# Patient Record
Sex: FEMALE | Race: WHITE | NOT HISPANIC OR LATINO | Employment: OTHER
[De-identification: names, ages, dates, MRNs, and addresses within clinical notes are randomized per-mention and may not be internally consistent; named-entity substitution may affect disease eponyms.]

---

## 2017-03-09 ENCOUNTER — CHARTING TRANS (OUTPATIENT)
Dept: OTHER | Age: 82
End: 2017-03-09

## 2017-04-26 ENCOUNTER — CHARTING TRANS (OUTPATIENT)
Dept: OTHER | Age: 82
End: 2017-04-26

## 2017-04-26 ENCOUNTER — MYAURORA ACCOUNT LINK (OUTPATIENT)
Dept: OTHER | Age: 82
End: 2017-04-26

## 2017-06-08 ENCOUNTER — CHARTING TRANS (OUTPATIENT)
Dept: OTHER | Age: 82
End: 2017-06-08

## 2017-06-09 ENCOUNTER — CHARTING TRANS (OUTPATIENT)
Dept: OTHER | Age: 82
End: 2017-06-09

## 2017-06-12 ENCOUNTER — MYAURORA ACCOUNT LINK (OUTPATIENT)
Dept: OTHER | Age: 82
End: 2017-06-12

## 2017-06-12 ENCOUNTER — CHARTING TRANS (OUTPATIENT)
Dept: OTHER | Age: 82
End: 2017-06-12

## 2017-07-03 ENCOUNTER — CHARTING TRANS (OUTPATIENT)
Dept: FAMILY MEDICINE | Age: 82
End: 2017-07-03

## 2017-07-03 ENCOUNTER — IMAGING SERVICES (OUTPATIENT)
Dept: OTHER | Age: 82
End: 2017-07-03

## 2017-07-03 ENCOUNTER — CHARTING TRANS (OUTPATIENT)
Dept: OTHER | Age: 82
End: 2017-07-03

## 2017-07-05 ENCOUNTER — CHARTING TRANS (OUTPATIENT)
Dept: OTHER | Age: 82
End: 2017-07-05

## 2017-07-07 ENCOUNTER — LAB SERVICES (OUTPATIENT)
Dept: OTHER | Age: 82
End: 2017-07-07

## 2017-07-07 LAB
ALBUMIN SERPL BCG-MCNC: 3.8 G/DL (ref 3.6–5.1)
ALP SERPL-CCNC: 80 U/L (ref 45–105)
ALT SERPL W/O P-5'-P-CCNC: 30 U/L (ref 15–43)
AST SERPL-CCNC: 29 U/L (ref 14–43)
BASOPHIL %: 0.5 % (ref 0–1.2)
BASOPHIL ABSOLUTE #: 0 10*3/UL (ref 0–0.1)
BILIRUB SERPL-MCNC: 0.5 MG/DL (ref 0–1.3)
BUN SERPL-MCNC: 14 MG/DL (ref 7–20)
CALCIUM SERPL-MCNC: 9.3 MG/DL (ref 8.6–10.6)
CHLORIDE SERPL-SCNC: 107 MMOL/L (ref 96–107)
CHOLEST SERPL-MCNC: 192 MG/DL (ref 140–200)
CREATININE, SERUM: 0.7 MG/DL (ref 0.5–1.4)
DIFFERENTIAL TYPE: ABNORMAL
EOSINOPHIL %: 2.7 % (ref 0–10)
EOSINOPHIL ABSOLUTE #: 0.2 10*3/UL (ref 0–0.5)
GFR SERPL CREATININE-BSD FRML MDRD: >60 ML/MIN/{1.73M2}
GFR SERPL CREATININE-BSD FRML MDRD: >60 ML/MIN/{1.73M2}
GLUCOSE P FAST SERPL-MCNC: 93 MG/DL (ref 60–100)
HCO3 SERPL-SCNC: 28 MMOL/L (ref 22–32)
HDLC SERPL-MCNC: 63 MG/DL
HEMATOCRIT: 38.7 % (ref 34–45)
HEMOGLOBIN: 12.5 G/DL (ref 11.2–15.7)
LDLC SERPL CALC-MCNC: 112 MG/DL (ref 30–100)
LYMPH PERCENT: 19.8 % (ref 20.5–51.1)
LYMPHOCYTE ABSOLUTE #: 1.6 10*3/UL (ref 1.2–3.4)
MEAN CORPUSCULAR HGB CONCENTRATION: 32.3 % (ref 32–36)
MEAN CORPUSCULAR HGB: 28 PG (ref 27–34)
MEAN CORPUSCULAR VOLUME: 86.6 FL (ref 79–95)
MEAN PLATELET VOLUME: 9.9 FL (ref 8.6–12.4)
MONOCYTE ABSOLUTE #: 0.7 10*3/UL (ref 0.2–0.9)
MONOCYTE PERCENT: 8.5 % (ref 4.3–12.9)
NEUTROPHIL ABSOLUTE #: 5.5 10*3/UL (ref 1.4–6.5)
NEUTROPHIL PERCENT: 68.5 % (ref 34–73.5)
PLATELET COUNT: 246 10*3/UL (ref 150–400)
POTASSIUM SERPL-SCNC: 4.4 MMOL/L (ref 3.5–5.3)
PROT SERPL-MCNC: 6.8 G/DL (ref 6.4–8.5)
RED BLOOD CELL COUNT: 4.47 10*6/UL (ref 3.7–5.2)
RED CELL DISTRIBUTION WIDTH: 13.8 % (ref 11.3–14.8)
SODIUM SERPL-SCNC: 142 MMOL/L (ref 136–146)
TRIGL SERPL-MCNC: 86 MG/DL (ref 0–200)
TSH SERPL DL<=0.05 MIU/L-ACNC: 2.03 M[IU]/L (ref 0.3–4.82)
WHITE BLOOD CELL COUNT: 8.1 10*3/UL (ref 4–10)

## 2017-07-18 ENCOUNTER — CHARTING TRANS (OUTPATIENT)
Dept: OTHER | Age: 82
End: 2017-07-18

## 2017-07-19 ENCOUNTER — CHARTING TRANS (OUTPATIENT)
Dept: OTHER | Age: 82
End: 2017-07-19

## 2017-07-20 ENCOUNTER — CHARTING TRANS (OUTPATIENT)
Dept: OTHER | Age: 82
End: 2017-07-20

## 2017-07-21 ENCOUNTER — MYAURORA ACCOUNT LINK (OUTPATIENT)
Dept: OTHER | Age: 82
End: 2017-07-21

## 2017-07-21 ENCOUNTER — CHARTING TRANS (OUTPATIENT)
Dept: OTHER | Age: 82
End: 2017-07-21

## 2017-07-21 ENCOUNTER — CHARTING TRANS (OUTPATIENT)
Dept: OTOLARYNGOLOGY | Age: 82
End: 2017-07-21

## 2017-07-25 ENCOUNTER — CHARTING TRANS (OUTPATIENT)
Dept: OTHER | Age: 82
End: 2017-07-25

## 2017-07-27 ENCOUNTER — MYAURORA ACCOUNT LINK (OUTPATIENT)
Dept: OTHER | Age: 82
End: 2017-07-27

## 2017-07-27 ENCOUNTER — CHARTING TRANS (OUTPATIENT)
Dept: OTHER | Age: 82
End: 2017-07-27

## 2017-07-31 ENCOUNTER — CHARTING TRANS (OUTPATIENT)
Dept: OTHER | Age: 82
End: 2017-07-31

## 2017-08-03 ENCOUNTER — CHARTING TRANS (OUTPATIENT)
Dept: OTHER | Age: 82
End: 2017-08-03

## 2017-08-04 ENCOUNTER — CHARTING TRANS (OUTPATIENT)
Dept: OTHER | Age: 82
End: 2017-08-04

## 2017-08-04 ENCOUNTER — IMAGING SERVICES (OUTPATIENT)
Dept: OTHER | Age: 82
End: 2017-08-04

## 2017-08-07 ENCOUNTER — CHARTING TRANS (OUTPATIENT)
Dept: OTHER | Age: 82
End: 2017-08-07

## 2017-08-10 ENCOUNTER — CHARTING TRANS (OUTPATIENT)
Dept: OTHER | Age: 82
End: 2017-08-10

## 2017-08-14 ENCOUNTER — CHARTING TRANS (OUTPATIENT)
Dept: OTHER | Age: 82
End: 2017-08-14

## 2017-08-17 ENCOUNTER — MYAURORA ACCOUNT LINK (OUTPATIENT)
Dept: OTHER | Age: 82
End: 2017-08-17

## 2017-08-17 ENCOUNTER — CHARTING TRANS (OUTPATIENT)
Dept: OTHER | Age: 82
End: 2017-08-17

## 2017-08-25 ENCOUNTER — CHARTING TRANS (OUTPATIENT)
Dept: OTHER | Age: 82
End: 2017-08-25

## 2017-11-16 ENCOUNTER — CHARTING TRANS (OUTPATIENT)
Dept: OTHER | Age: 82
End: 2017-11-16

## 2017-11-20 ENCOUNTER — CHARTING TRANS (OUTPATIENT)
Dept: FAMILY MEDICINE | Age: 82
End: 2017-11-20

## 2017-11-20 ENCOUNTER — IMAGING SERVICES (OUTPATIENT)
Dept: OTHER | Age: 82
End: 2017-11-20

## 2017-11-21 ENCOUNTER — CHARTING TRANS (OUTPATIENT)
Dept: OTHER | Age: 82
End: 2017-11-21

## 2017-11-24 ENCOUNTER — CHARTING TRANS (OUTPATIENT)
Dept: FAMILY MEDICINE | Age: 82
End: 2017-11-24

## 2017-12-01 ENCOUNTER — MYAURORA ACCOUNT LINK (OUTPATIENT)
Dept: OTHER | Age: 82
End: 2017-12-01

## 2017-12-01 ENCOUNTER — CHARTING TRANS (OUTPATIENT)
Dept: OTHER | Age: 82
End: 2017-12-01

## 2017-12-29 ENCOUNTER — IMAGING SERVICES (OUTPATIENT)
Dept: OTHER | Age: 82
End: 2017-12-29

## 2018-01-25 ENCOUNTER — CHARTING TRANS (OUTPATIENT)
Dept: OTHER | Age: 83
End: 2018-01-25

## 2018-01-30 ENCOUNTER — IMAGING SERVICES (OUTPATIENT)
Dept: OTHER | Age: 83
End: 2018-01-30

## 2018-02-03 ENCOUNTER — CHARTING TRANS (OUTPATIENT)
Dept: OTHER | Age: 83
End: 2018-02-03

## 2018-02-05 ENCOUNTER — ANCILLARY ORDERS (OUTPATIENT)
Dept: OTHER | Age: 83
End: 2018-02-05

## 2018-02-05 DIAGNOSIS — R93.89 ABNORMAL FINDINGS ON DIAGNOSTIC IMAGING OF OTHER SPECIFIED BODY STRUCTURES: ICD-10-CM

## 2018-02-07 ENCOUNTER — MYAURORA ACCOUNT LINK (OUTPATIENT)
Dept: OTHER | Age: 83
End: 2018-02-07

## 2018-02-07 ENCOUNTER — CHARTING TRANS (OUTPATIENT)
Dept: OTHER | Age: 83
End: 2018-02-07

## 2018-03-08 ENCOUNTER — CHARTING TRANS (OUTPATIENT)
Dept: OTHER | Age: 83
End: 2018-03-08

## 2018-03-29 ENCOUNTER — MYAURORA ACCOUNT LINK (OUTPATIENT)
Dept: OTHER | Age: 83
End: 2018-03-29

## 2018-03-29 ENCOUNTER — LAB SERVICES (OUTPATIENT)
Dept: OTHER | Age: 83
End: 2018-03-29

## 2018-03-29 ENCOUNTER — CHARTING TRANS (OUTPATIENT)
Dept: OTHER | Age: 83
End: 2018-03-29

## 2018-03-29 LAB
ALBUMIN SERPL BCG-MCNC: 4 G/DL (ref 3.6–5.1)
ALP SERPL-CCNC: 88 U/L (ref 45–105)
ALT SERPL W/O P-5'-P-CCNC: 22 U/L (ref 15–43)
AST SERPL-CCNC: 28 U/L (ref 14–43)
BASOPHIL %: 0.5 % (ref 0–1.2)
BASOPHIL ABSOLUTE #: 0 10*3/UL (ref 0–0.1)
BILIRUB SERPL-MCNC: 0.5 MG/DL (ref 0–1.3)
BNP BLD-MCNC: 100 PG/ML (ref 0–100)
BUN SERPL-MCNC: 21 MG/DL (ref 7–20)
CALCIUM SERPL-MCNC: 9.5 MG/DL (ref 8.6–10.6)
CHLORIDE SERPL-SCNC: 102 MMOL/L (ref 96–107)
CREATININE, SERUM: 0.7 MG/DL (ref 0.5–1.4)
DIFFERENTIAL TYPE: NORMAL
EOSINOPHIL %: 3.4 % (ref 0–10)
EOSINOPHIL ABSOLUTE #: 0.2 10*3/UL (ref 0–0.5)
GFR SERPL CREATININE-BSD FRML MDRD: >60 ML/MIN/{1.73M2}
GFR SERPL CREATININE-BSD FRML MDRD: >60 ML/MIN/{1.73M2}
GLUCOSE SERPL-MCNC: 92 MG/DL (ref 70–200)
HCO3 SERPL-SCNC: 26 MMOL/L (ref 22–32)
HEMATOCRIT: 35.8 % (ref 34–45)
HEMOGLOBIN: 11.8 G/DL (ref 11.2–15.7)
LYMPH PERCENT: 24.2 % (ref 20.5–51.1)
LYMPHOCYTE ABSOLUTE #: 1.6 10*3/UL (ref 1.2–3.4)
MEAN CORPUSCULAR HGB CONCENTRATION: 33 % (ref 32–36)
MEAN CORPUSCULAR HGB: 28.4 PG (ref 27–34)
MEAN CORPUSCULAR VOLUME: 86.1 FL (ref 79–95)
MEAN PLATELET VOLUME: 9.7 FL (ref 8.6–12.4)
MONOCYTE ABSOLUTE #: 0.7 10*3/UL (ref 0.2–0.9)
MONOCYTE PERCENT: 10.5 % (ref 4.3–12.9)
NEUTROPHIL ABSOLUTE #: 4 10*3/UL (ref 1.4–6.5)
NEUTROPHIL PERCENT: 61.4 % (ref 34–73.5)
PLATELET COUNT: 244 10*3/UL (ref 150–400)
POTASSIUM SERPL-SCNC: 4.6 MMOL/L (ref 3.5–5.3)
PROT SERPL-MCNC: 7.1 G/DL (ref 6.4–8.5)
RED BLOOD CELL COUNT: 4.16 10*6/UL (ref 3.7–5.2)
RED CELL DISTRIBUTION WIDTH: 13.8 % (ref 11.3–14.8)
SODIUM SERPL-SCNC: 138 MMOL/L (ref 136–146)
WHITE BLOOD CELL COUNT: 6.5 10*3/UL (ref 4–10)

## 2018-04-03 ENCOUNTER — CHARTING TRANS (OUTPATIENT)
Dept: OTHER | Age: 83
End: 2018-04-03

## 2018-04-06 ENCOUNTER — CHARTING TRANS (OUTPATIENT)
Dept: OTHER | Age: 83
End: 2018-04-06

## 2018-04-10 ENCOUNTER — CHARTING TRANS (OUTPATIENT)
Dept: OTHER | Age: 83
End: 2018-04-10

## 2018-04-14 ENCOUNTER — CHARTING TRANS (OUTPATIENT)
Dept: OTHER | Age: 83
End: 2018-04-14

## 2018-04-18 ENCOUNTER — CHARTING TRANS (OUTPATIENT)
Dept: OTHER | Age: 83
End: 2018-04-18

## 2018-04-23 ENCOUNTER — CHARTING TRANS (OUTPATIENT)
Dept: OTHER | Age: 83
End: 2018-04-23

## 2018-04-23 ENCOUNTER — IMAGING SERVICES (OUTPATIENT)
Dept: OTHER | Age: 83
End: 2018-04-23

## 2018-04-23 ENCOUNTER — MYAURORA ACCOUNT LINK (OUTPATIENT)
Dept: OTHER | Age: 83
End: 2018-04-23

## 2018-04-24 ENCOUNTER — CHARTING TRANS (OUTPATIENT)
Dept: OTHER | Age: 83
End: 2018-04-24

## 2018-04-24 ENCOUNTER — ANCILLARY ORDERS (OUTPATIENT)
Dept: OTHER | Age: 83
End: 2018-04-24

## 2018-04-24 DIAGNOSIS — M81.0 AGE-RELATED OSTEOPOROSIS WITHOUT CURRENT PATHOLOGICAL FRACTURE: ICD-10-CM

## 2018-04-26 ENCOUNTER — CHARTING TRANS (OUTPATIENT)
Dept: OTHER | Age: 83
End: 2018-04-26

## 2018-04-30 ENCOUNTER — CHARTING TRANS (OUTPATIENT)
Dept: OTHER | Age: 83
End: 2018-04-30

## 2018-05-17 ENCOUNTER — CHARTING TRANS (OUTPATIENT)
Dept: OTHER | Age: 83
End: 2018-05-17

## 2018-05-17 ENCOUNTER — IMAGING SERVICES (OUTPATIENT)
Dept: OTHER | Age: 83
End: 2018-05-17

## 2018-05-21 ENCOUNTER — CHARTING TRANS (OUTPATIENT)
Dept: OTHER | Age: 83
End: 2018-05-21

## 2018-05-25 ENCOUNTER — CHARTING TRANS (OUTPATIENT)
Dept: OTHER | Age: 83
End: 2018-05-25

## 2018-06-01 ENCOUNTER — MYAURORA ACCOUNT LINK (OUTPATIENT)
Dept: OTHER | Age: 83
End: 2018-06-01

## 2018-06-05 ENCOUNTER — CHARTING TRANS (OUTPATIENT)
Dept: OTHER | Age: 83
End: 2018-06-05

## 2018-07-20 ENCOUNTER — CHARTING TRANS (OUTPATIENT)
Dept: OTHER | Age: 83
End: 2018-07-20

## 2018-07-27 ENCOUNTER — MYAURORA ACCOUNT LINK (OUTPATIENT)
Dept: OTHER | Age: 83
End: 2018-07-27

## 2018-07-27 ENCOUNTER — LAB SERVICES (OUTPATIENT)
Dept: OTHER | Age: 83
End: 2018-07-27

## 2018-07-27 LAB
ALBUMIN SERPL-MCNC: 4.3 G/DL (ref 3.6–5.1)
ALP SERPL-CCNC: 81 U/L (ref 45–130)
ALT SERPL-CCNC: 20 U/L (ref 15–43)
AST SERPL-CCNC: 30 U/L (ref 14–43)
BASOPHIL %: 0.5 % (ref 0–1.2)
BASOPHIL ABSOLUTE #: 0 10*3/UL (ref 0–0.1)
BILIRUB SERPL-MCNC: 0.6 MG/DL (ref 0–1.3)
BUN SERPL-MCNC: 14 MG/DL (ref 7–20)
CALCIUM SERPL-MCNC: 9.9 MG/DL (ref 8.6–10.6)
CHLORIDE SERPL-SCNC: 101 MMOL/L (ref 96–107)
CHOLEST SERPL-MCNC: 193 MG/DL (ref 140–200)
CO2 SERPL-SCNC: 31 MMOL/L (ref 22–32)
CREAT SERPL-MCNC: 0.6 MG/DL (ref 0.5–1.4)
DIFFERENTIAL TYPE: NORMAL
EOSINOPHIL %: 2.7 % (ref 0–10)
EOSINOPHIL ABSOLUTE #: 0.2 10*3/UL (ref 0–0.5)
GFR SERPL CREATININE-BSD FRML MDRD: >60 ML/MIN/{1.73M2}
GFR SERPL CREATININE-BSD FRML MDRD: >60 ML/MIN/{1.73M2}
GLUCOSE P FAST SERPL-MCNC: 95 MG/DL (ref 60–100)
HDLC SERPL-MCNC: 64 MG/DL
HEMATOCRIT: 39.9 % (ref 34–45)
HEMOGLOBIN: 12.8 G/DL (ref 11.2–15.7)
LDLC SERPL CALC-MCNC: 110 MG/DL (ref 30–100)
LYMPH PERCENT: 21 % (ref 20.5–51.1)
LYMPHOCYTE ABSOLUTE #: 1.7 10*3/UL (ref 1.2–3.4)
MEAN CORPUSCULAR HGB CONCENTRATION: 32.1 % (ref 32–36)
MEAN CORPUSCULAR HGB: 27.3 PG (ref 27–34)
MEAN CORPUSCULAR VOLUME: 85.1 FL (ref 79–95)
MEAN PLATELET VOLUME: 10.1 FL (ref 8.6–12.4)
MONOCYTE ABSOLUTE #: 0.6 10*3/UL (ref 0.2–0.9)
MONOCYTE PERCENT: 8 % (ref 4.3–12.9)
NEUTROPHIL ABSOLUTE #: 5.5 10*3/UL (ref 1.4–6.5)
NEUTROPHIL PERCENT: 67.8 % (ref 34–73.5)
PLATELET COUNT: 227 10*3/UL (ref 150–400)
POTASSIUM SERPL-SCNC: 5 MMOL/L (ref 3.5–5.3)
PROT SERPL-MCNC: 7.4 G/DL (ref 6.4–8.5)
RED BLOOD CELL COUNT: 4.69 10*6/UL (ref 3.7–5.2)
RED CELL DISTRIBUTION WIDTH: 14.7 % (ref 11.3–14.8)
SODIUM SERPL-SCNC: 140 MMOL/L (ref 136–146)
TRIGL SERPL-MCNC: 97 MG/DL (ref 0–200)
TSH SERPL DL<=0.05 MIU/L-ACNC: 2.27 M[IU]/L (ref 0.3–4.82)
WHITE BLOOD CELL COUNT: 8 10*3/UL (ref 4–10)

## 2018-10-19 ENCOUNTER — IMAGING SERVICES (OUTPATIENT)
Dept: OTHER | Age: 83
End: 2018-10-19

## 2018-10-19 ENCOUNTER — CHARTING TRANS (OUTPATIENT)
Dept: OTHER | Age: 83
End: 2018-10-19

## 2018-11-15 ENCOUNTER — CHARTING TRANS (OUTPATIENT)
Dept: OTHER | Age: 83
End: 2018-11-15

## 2018-11-23 ENCOUNTER — IMAGING SERVICES (OUTPATIENT)
Dept: OTHER | Age: 83
End: 2018-11-23

## 2018-11-27 VITALS
OXYGEN SATURATION: 98 % | HEART RATE: 79 BPM | TEMPERATURE: 99.3 F | DIASTOLIC BLOOD PRESSURE: 62 MMHG | WEIGHT: 115 LBS | SYSTOLIC BLOOD PRESSURE: 130 MMHG

## 2018-11-27 VITALS
SYSTOLIC BLOOD PRESSURE: 128 MMHG | HEART RATE: 97 BPM | OXYGEN SATURATION: 97 % | WEIGHT: 118 LBS | TEMPERATURE: 97 F | DIASTOLIC BLOOD PRESSURE: 74 MMHG

## 2018-11-28 VITALS — BODY MASS INDEX: 19.81 KG/M2 | WEIGHT: 116 LBS | HEIGHT: 64 IN

## 2018-11-28 VITALS
HEART RATE: 61 BPM | DIASTOLIC BLOOD PRESSURE: 98 MMHG | OXYGEN SATURATION: 98 % | HEIGHT: 64 IN | SYSTOLIC BLOOD PRESSURE: 142 MMHG | WEIGHT: 117 LBS | BODY MASS INDEX: 19.97 KG/M2

## 2018-11-28 VITALS — SYSTOLIC BLOOD PRESSURE: 140 MMHG | DIASTOLIC BLOOD PRESSURE: 80 MMHG | WEIGHT: 116 LBS | HEART RATE: 88 BPM

## 2018-12-04 ENCOUNTER — TELEPHONE (OUTPATIENT)
Dept: FAMILY MEDICINE | Age: 83
End: 2018-12-04

## 2018-12-04 ENCOUNTER — TELEPHONE (OUTPATIENT)
Dept: PULMONOLOGY | Age: 83
End: 2018-12-04

## 2018-12-05 RX ORDER — AZITHROMYCIN 250 MG/1
TABLET, FILM COATED ORAL
Qty: 6 TABLET | Refills: 0 | Status: SHIPPED | OUTPATIENT
Start: 2018-12-05 | End: 2018-12-10

## 2018-12-06 ENCOUNTER — APPOINTMENT (OUTPATIENT)
Dept: OPTOMETRY | Age: 83
End: 2018-12-06

## 2018-12-10 ENCOUNTER — TELEPHONE (OUTPATIENT)
Dept: SCHEDULING | Age: 83
End: 2018-12-10

## 2018-12-10 ENCOUNTER — OFFICE VISIT (OUTPATIENT)
Dept: PULMONOLOGY | Age: 83
End: 2018-12-10

## 2018-12-10 VITALS
TEMPERATURE: 98.1 F | HEART RATE: 87 BPM | DIASTOLIC BLOOD PRESSURE: 68 MMHG | BODY MASS INDEX: 19.46 KG/M2 | HEIGHT: 64 IN | SYSTOLIC BLOOD PRESSURE: 136 MMHG | WEIGHT: 114 LBS | OXYGEN SATURATION: 96 %

## 2018-12-10 VITALS — BODY MASS INDEX: 19.46 KG/M2 | HEIGHT: 64 IN | WEIGHT: 114 LBS

## 2018-12-10 DIAGNOSIS — I27.0 PPHT (PRIMARY PULMONARY HYPERTENSION) (CMD): ICD-10-CM

## 2018-12-10 DIAGNOSIS — J84.10 LUNG FIBROSIS (CMD): ICD-10-CM

## 2018-12-10 PROCEDURE — 94010 BREATHING CAPACITY TEST: CPT

## 2018-12-10 PROCEDURE — 94727 GAS DIL/WSHOT DETER LNG VOL: CPT

## 2018-12-10 PROCEDURE — 99214 OFFICE O/P EST MOD 30 MIN: CPT | Performed by: INTERNAL MEDICINE

## 2018-12-10 PROCEDURE — 94729 DIFFUSING CAPACITY: CPT

## 2019-01-29 ENCOUNTER — APPOINTMENT (OUTPATIENT)
Dept: OPTOMETRY | Age: 84
End: 2019-01-29

## 2019-02-07 ENCOUNTER — OFFICE VISIT (OUTPATIENT)
Dept: OPTOMETRY | Age: 84
End: 2019-02-07

## 2019-02-07 DIAGNOSIS — H52.12 MYOPIA, LEFT EYE: ICD-10-CM

## 2019-02-07 DIAGNOSIS — H04.123 DRY EYE SYNDROME, BILATERAL: Primary | ICD-10-CM

## 2019-02-07 DIAGNOSIS — H52.4 PRESBYOPIA: ICD-10-CM

## 2019-02-07 PROCEDURE — 92015 DETERMINE REFRACTIVE STATE: CPT | Performed by: OPTOMETRIST

## 2019-02-07 PROCEDURE — 99213 OFFICE O/P EST LOW 20 MIN: CPT | Performed by: OPTOMETRIST

## 2019-02-07 ASSESSMENT — REFRACTION_MANIFEST
OD_ADD: +2.50
OS_ADD: +250.00
OS_SPHERE: -0.25
OD_SPHERE: PLANO

## 2019-02-07 ASSESSMENT — VISUAL ACUITY
OS_SC: J2
OS_SC+: +2
METHOD: SNELLEN - LINEAR
OS_SC: 20/30
OD_SC+: -1
OD_SC: J2
OD_SC: 20/25

## 2019-02-07 ASSESSMENT — EXTERNAL EXAM - LEFT EYE: OS_EXAM: NORMAL

## 2019-02-07 ASSESSMENT — CUP TO DISC RATIO
OS_RATIO: 0.1
OD_RATIO: 0.1

## 2019-02-07 ASSESSMENT — ENCOUNTER SYMPTOMS: EYES NEGATIVE: 1

## 2019-02-07 ASSESSMENT — EXTERNAL EXAM - RIGHT EYE: OD_EXAM: NORMAL

## 2019-02-07 ASSESSMENT — TONOMETRY
OD_IOP_MMHG: 11
IOP_METHOD: APPLANATION
OS_IOP_MMHG: 11

## 2019-02-07 ASSESSMENT — CONF VISUAL FIELD
OS_NORMAL: 1
OD_NORMAL: 1

## 2019-02-07 ASSESSMENT — SLIT LAMP EXAM - LIDS
COMMENTS: NORMAL
COMMENTS: NORMAL

## 2019-03-06 VITALS
TEMPERATURE: 97.7 F | HEART RATE: 80 BPM | WEIGHT: 116 LBS | SYSTOLIC BLOOD PRESSURE: 140 MMHG | BODY MASS INDEX: 19.91 KG/M2 | DIASTOLIC BLOOD PRESSURE: 88 MMHG | OXYGEN SATURATION: 97 %

## 2019-03-06 VITALS
WEIGHT: 113 LBS | SYSTOLIC BLOOD PRESSURE: 140 MMHG | BODY MASS INDEX: 19.4 KG/M2 | DIASTOLIC BLOOD PRESSURE: 80 MMHG | HEART RATE: 78 BPM

## 2019-03-06 VITALS
WEIGHT: 110 LBS | HEIGHT: 64 IN | RESPIRATION RATE: 17 BRPM | HEART RATE: 96 BPM | BODY MASS INDEX: 18.78 KG/M2 | SYSTOLIC BLOOD PRESSURE: 92 MMHG | DIASTOLIC BLOOD PRESSURE: 66 MMHG

## 2019-07-19 ENCOUNTER — TELEPHONE (OUTPATIENT)
Dept: FAMILY MEDICINE | Age: 84
End: 2019-07-19

## 2019-07-19 ENCOUNTER — E-ADVICE (OUTPATIENT)
Dept: FAMILY MEDICINE | Age: 84
End: 2019-07-19

## 2019-07-26 ENCOUNTER — OFFICE VISIT (OUTPATIENT)
Dept: FAMILY MEDICINE | Age: 84
End: 2019-07-26

## 2019-07-26 ENCOUNTER — LAB SERVICES (OUTPATIENT)
Dept: LAB | Age: 84
End: 2019-07-26

## 2019-07-26 VITALS
HEART RATE: 60 BPM | HEIGHT: 64 IN | BODY MASS INDEX: 19.29 KG/M2 | DIASTOLIC BLOOD PRESSURE: 72 MMHG | WEIGHT: 113 LBS | SYSTOLIC BLOOD PRESSURE: 136 MMHG

## 2019-07-26 DIAGNOSIS — Z00.00 MEDICARE ANNUAL WELLNESS VISIT, SUBSEQUENT: Primary | ICD-10-CM

## 2019-07-26 DIAGNOSIS — M79.89 LEG SWELLING: ICD-10-CM

## 2019-07-26 DIAGNOSIS — J43.9 PULMONARY EMPHYSEMA, UNSPECIFIED EMPHYSEMA TYPE (CMD): ICD-10-CM

## 2019-07-26 DIAGNOSIS — I10 ESSENTIAL (PRIMARY) HYPERTENSION: ICD-10-CM

## 2019-07-26 DIAGNOSIS — J84.9 INTERSTITIAL LUNG DISEASE (CMD): ICD-10-CM

## 2019-07-26 DIAGNOSIS — L23.7 POISON IVY: ICD-10-CM

## 2019-07-26 PROBLEM — M75.01 ADHESIVE CAPSULITIS OF RIGHT SHOULDER: Status: ACTIVE | Noted: 2017-07-18

## 2019-07-26 LAB
ALBUMIN SERPL-MCNC: 4.1 G/DL (ref 3.6–5.1)
ALP SERPL-CCNC: 82 U/L (ref 45–130)
ALT SERPL W/O P-5'-P-CCNC: 15 U/L (ref 4–38)
AST SERPL-CCNC: 30 U/L (ref 14–43)
BASOPHIL %: 0.6 % (ref 0–1.2)
BASOPHIL ABSOLUTE #: 0 10*3/UL (ref 0–0.1)
BILIRUB SERPL-MCNC: 0.7 MG/DL (ref 0–1.3)
BUN SERPL-MCNC: 14 MG/DL (ref 7–20)
CALCIUM SERPL-MCNC: 9.6 MG/DL (ref 8.6–10.6)
CHLORIDE SERPL-SCNC: 103 MMOL/L (ref 96–107)
CHOLEST SERPL-MCNC: 187 MG/DL (ref 140–200)
CO2 SERPL-SCNC: 32 MMOL/L (ref 22–32)
CREAT SERPL-MCNC: 0.8 MG/DL (ref 0.5–1.4)
DIFFERENTIAL TYPE: NORMAL
EOSINOPHIL %: 5.2 % (ref 0–10)
EOSINOPHIL ABSOLUTE #: 0.4 10*3/UL (ref 0–0.5)
GFR SERPL CREATININE-BSD FRML MDRD: >60 ML/MIN/{1.73M2}
GFR SERPL CREATININE-BSD FRML MDRD: >60 ML/MIN/{1.73M2}
GLUCOSE P FAST SERPL-MCNC: 92 MG/DL (ref 60–100)
HDLC SERPL-MCNC: 64 MG/DL
HEMATOCRIT: 40.9 % (ref 34–45)
HEMOGLOBIN: 13.2 G/DL (ref 11.2–15.7)
LDLC SERPL CALC-MCNC: 103 MG/DL (ref 30–100)
LYMPH PERCENT: 21.4 % (ref 20.5–51.1)
LYMPHOCYTE ABSOLUTE #: 1.4 10*3/UL (ref 1.2–3.4)
MEAN CORPUSCULAR HGB CONCENTRATION: 32.3 % (ref 32–36)
MEAN CORPUSCULAR HGB: 28.6 PG (ref 27–34)
MEAN CORPUSCULAR VOLUME: 88.7 FL (ref 79–95)
MEAN PLATELET VOLUME: 10.1 FL (ref 8.6–12.4)
MONOCYTE ABSOLUTE #: 0.5 10*3/UL (ref 0.2–0.9)
MONOCYTE PERCENT: 8 % (ref 4.3–12.9)
NEUTROPHIL ABSOLUTE #: 4.4 10*3/UL (ref 1.4–6.5)
NEUTROPHIL PERCENT: 64.8 % (ref 34–73.5)
PLATELET COUNT: 252 10*3/UL (ref 150–400)
POTASSIUM SERPL-SCNC: 4.4 MMOL/L (ref 3.5–5.3)
PROT SERPL-MCNC: 7.3 G/DL (ref 6.4–8.5)
RED BLOOD CELL COUNT: 4.61 10*6/UL (ref 3.7–5.2)
RED CELL DISTRIBUTION WIDTH: 13.9 % (ref 11.3–14.8)
SODIUM SERPL-SCNC: 140 MMOL/L (ref 136–146)
TRIGL SERPL-MCNC: 100 MG/DL (ref 0–200)
TSH SERPL DL<=0.05 MIU/L-ACNC: 3.32 M[IU]/L (ref 0.3–4.82)
WHITE BLOOD CELL COUNT: 6.7 10*3/UL (ref 4–10)

## 2019-07-26 PROCEDURE — 99213 OFFICE O/P EST LOW 20 MIN: CPT | Performed by: FAMILY MEDICINE

## 2019-07-26 PROCEDURE — G0439 PPPS, SUBSEQ VISIT: HCPCS | Performed by: FAMILY MEDICINE

## 2019-07-26 PROCEDURE — 80053 COMPREHEN METABOLIC PANEL: CPT | Performed by: FAMILY MEDICINE

## 2019-07-26 PROCEDURE — 85025 COMPLETE CBC W/AUTO DIFF WBC: CPT | Performed by: FAMILY MEDICINE

## 2019-07-26 PROCEDURE — 84443 ASSAY THYROID STIM HORMONE: CPT | Performed by: FAMILY MEDICINE

## 2019-07-26 PROCEDURE — 80061 LIPID PANEL: CPT | Performed by: FAMILY MEDICINE

## 2019-07-26 PROCEDURE — 36415 COLL VENOUS BLD VENIPUNCTURE: CPT | Performed by: FAMILY MEDICINE

## 2019-07-26 RX ORDER — TRIAMCINOLONE ACETONIDE 1 MG/G
CREAM TOPICAL 2 TIMES DAILY
Qty: 30 G | Refills: 1 | Status: SHIPPED | OUTPATIENT
Start: 2019-07-26 | End: 2019-08-12 | Stop reason: ALTCHOICE

## 2019-07-26 RX ORDER — PREDNISONE 50 MG/1
50 TABLET ORAL DAILY
Qty: 5 TABLET | Refills: 0 | Status: SHIPPED | OUTPATIENT
Start: 2019-07-26 | End: 2019-08-12 | Stop reason: ALTCHOICE

## 2019-07-26 ASSESSMENT — ENCOUNTER SYMPTOMS
VOMITING: 0
CONSTIPATION: 0
POLYPHAGIA: 0
SHORTNESS OF BREATH: 0
BACK PAIN: 0
EYE DISCHARGE: 0
FATIGUE: 0
POLYDIPSIA: 0
NAUSEA: 0
EYE REDNESS: 0
CHILLS: 0
RHINORRHEA: 0
FEVER: 0
SORE THROAT: 0
BRUISES/BLEEDS EASILY: 0
COUGH: 0
DIZZINESS: 0
LIGHT-HEADEDNESS: 0
APPETITE CHANGE: 0
WEAKNESS: 0
SLEEP DISTURBANCE: 0
DIARRHEA: 0
HEADACHES: 0
NERVOUS/ANXIOUS: 0
BLOOD IN STOOL: 0
WHEEZING: 0

## 2019-07-26 ASSESSMENT — ACTIVITIES OF DAILY LIVING (ADL)
ADL_SCORE: 12
SENSORY_SUPPORT_DEVICES: HEARING AIDS
NEEDS_ASSIST: NO
ADL_BEFORE_ADMISSION: INDEPENDENT

## 2019-07-26 ASSESSMENT — PATIENT HEALTH QUESTIONNAIRE - PHQ9
2. FEELING DOWN, DEPRESSED OR HOPELESS: NOT AT ALL
SUM OF ALL RESPONSES TO PHQ9 QUESTIONS 1 AND 2: 0
1. LITTLE INTEREST OR PLEASURE IN DOING THINGS: NOT AT ALL
SUM OF ALL RESPONSES TO PHQ9 QUESTIONS 1 AND 2: 0

## 2019-08-12 ENCOUNTER — OFFICE VISIT (OUTPATIENT)
Dept: PULMONOLOGY | Age: 84
End: 2019-08-12

## 2019-08-12 VITALS
DIASTOLIC BLOOD PRESSURE: 70 MMHG | SYSTOLIC BLOOD PRESSURE: 134 MMHG | HEART RATE: 64 BPM | TEMPERATURE: 98.7 F | RESPIRATION RATE: 14 BRPM | BODY MASS INDEX: 19.29 KG/M2 | HEIGHT: 64 IN | WEIGHT: 113 LBS | OXYGEN SATURATION: 96 %

## 2019-08-12 DIAGNOSIS — J84.9 ILD (INTERSTITIAL LUNG DISEASE) (CMD): Primary | ICD-10-CM

## 2019-08-12 PROCEDURE — 99214 OFFICE O/P EST MOD 30 MIN: CPT | Performed by: INTERNAL MEDICINE

## 2019-08-12 PROCEDURE — 94010 BREATHING CAPACITY TEST: CPT | Performed by: INTERNAL MEDICINE

## 2019-09-26 ENCOUNTER — WALK IN (OUTPATIENT)
Dept: URGENT CARE | Age: 84
End: 2019-09-26

## 2019-09-26 DIAGNOSIS — Z23 NEED FOR IMMUNIZATION AGAINST INFLUENZA: Primary | ICD-10-CM

## 2019-09-26 PROCEDURE — 90662 IIV NO PRSV INCREASED AG IM: CPT | Performed by: NURSE PRACTITIONER

## 2019-09-26 PROCEDURE — G0008 ADMIN INFLUENZA VIRUS VAC: HCPCS | Performed by: NURSE PRACTITIONER

## 2019-10-19 DIAGNOSIS — Z12.31 VISIT FOR SCREENING MAMMOGRAM: Primary | ICD-10-CM

## 2019-11-11 ENCOUNTER — IMAGING SERVICES (OUTPATIENT)
Dept: MAMMOGRAPHY | Age: 84
End: 2019-11-11
Attending: FAMILY MEDICINE

## 2019-11-11 DIAGNOSIS — Z12.31 VISIT FOR SCREENING MAMMOGRAM: ICD-10-CM

## 2019-11-11 PROCEDURE — 77063 BREAST TOMOSYNTHESIS BI: CPT | Performed by: RADIOLOGY

## 2019-11-11 PROCEDURE — 77067 SCR MAMMO BI INCL CAD: CPT | Performed by: RADIOLOGY

## 2019-11-14 ENCOUNTER — TELEPHONE (OUTPATIENT)
Dept: FAMILY MEDICINE | Age: 84
End: 2019-11-14

## 2019-11-14 DIAGNOSIS — R92.30 DENSE BREASTS: Primary | ICD-10-CM

## 2019-11-14 DIAGNOSIS — Z12.39 ENCOUNTER FOR BREAST CANCER SCREENING OTHER THAN MAMMOGRAM: ICD-10-CM

## 2019-11-14 DIAGNOSIS — Z13.79 GENETIC TESTING: Primary | ICD-10-CM

## 2019-11-14 DIAGNOSIS — R92.2 DENSE BREASTS: Primary | ICD-10-CM

## 2019-11-29 ENCOUNTER — IMAGING SERVICES (OUTPATIENT)
Dept: ULTRASOUND IMAGING | Age: 84
End: 2019-11-29
Attending: FAMILY MEDICINE

## 2019-11-29 DIAGNOSIS — R92.30 DENSE BREASTS: ICD-10-CM

## 2019-11-29 DIAGNOSIS — R93.89 ABNORMAL ULTRASOUND: Primary | ICD-10-CM

## 2019-11-29 DIAGNOSIS — R92.2 DENSE BREASTS: ICD-10-CM

## 2019-11-29 DIAGNOSIS — R92.8 ABNORMAL MAMMOGRAM: Primary | ICD-10-CM

## 2019-11-29 DIAGNOSIS — Z12.39 ENCOUNTER FOR BREAST CANCER SCREENING OTHER THAN MAMMOGRAM: ICD-10-CM

## 2019-11-29 PROCEDURE — 76641 ULTRASOUND BREAST COMPLETE: CPT | Performed by: RADIOLOGY

## 2019-12-16 ENCOUNTER — EXTERNAL RECORD (OUTPATIENT)
Dept: HEALTH INFORMATION MANAGEMENT | Facility: OTHER | Age: 84
End: 2019-12-16

## 2019-12-23 ENCOUNTER — LAB SERVICES (OUTPATIENT)
Dept: LAB | Age: 84
End: 2019-12-23

## 2019-12-23 ENCOUNTER — OFFICE VISIT (OUTPATIENT)
Dept: FAMILY MEDICINE | Age: 84
End: 2019-12-23

## 2019-12-23 VITALS
SYSTOLIC BLOOD PRESSURE: 138 MMHG | BODY MASS INDEX: 19.22 KG/M2 | WEIGHT: 112 LBS | HEART RATE: 60 BPM | DIASTOLIC BLOOD PRESSURE: 80 MMHG

## 2019-12-23 DIAGNOSIS — H91.93 BILATERAL CHANGE IN HEARING: ICD-10-CM

## 2019-12-23 DIAGNOSIS — R09.89 LABILE BLOOD PRESSURE: ICD-10-CM

## 2019-12-23 DIAGNOSIS — R10.9 FLANK PAIN: Primary | ICD-10-CM

## 2019-12-23 DIAGNOSIS — F41.9 ANXIETY: ICD-10-CM

## 2019-12-23 DIAGNOSIS — R10.9 FLANK PAIN: ICD-10-CM

## 2019-12-23 DIAGNOSIS — I10 ESSENTIAL (PRIMARY) HYPERTENSION: ICD-10-CM

## 2019-12-23 LAB
ALBUMIN SERPL-MCNC: 4.1 G/DL (ref 3.6–5.1)
ALP SERPL-CCNC: 71 U/L (ref 45–130)
ALT SERPL W/O P-5'-P-CCNC: 14 U/L (ref 4–38)
AST SERPL-CCNC: 28 U/L (ref 14–43)
BASOPHIL %: 0.6 % (ref 0–1.2)
BASOPHIL ABSOLUTE #: 0 10*3/UL (ref 0–0.1)
BILIRUB SERPL-MCNC: 0.5 MG/DL (ref 0–1.3)
BILIRUBIN URINE: NEGATIVE
BLOOD URINE: NEGATIVE
BUN SERPL-MCNC: 24 MG/DL (ref 7–20)
CALCIUM SERPL-MCNC: 9.4 MG/DL (ref 8.6–10.6)
CHLORIDE SERPL-SCNC: 106 MMOL/L (ref 96–107)
CLARITY: CLEAR
CO2 SERPL-SCNC: 29 MMOL/L (ref 22–32)
COLOR: YELLOW
CREAT SERPL-MCNC: 0.9 MG/DL (ref 0.5–1.4)
DIFFERENTIAL TYPE: ABNORMAL
EOSINOPHIL %: 4.1 % (ref 0–10)
EOSINOPHIL ABSOLUTE #: 0.3 10*3/UL (ref 0–0.5)
GFR SERPL CREATININE-BSD FRML MDRD: 59 ML/MIN/{1.73M2}
GFR SERPL CREATININE-BSD FRML MDRD: >60 ML/MIN/{1.73M2}
GLUCOSE QUALITATIVE U: NEGATIVE
GLUCOSE SERPL-MCNC: 77 MG/DL (ref 70–200)
HEMATOCRIT: 41.3 % (ref 34–45)
HEMOGLOBIN: 12.4 G/DL (ref 11.2–15.7)
KETONES, URINE: NEGATIVE
LEUKOCYTE ESTERASE URINE: NEGATIVE
LYMPH PERCENT: 18.9 % (ref 20.5–51.1)
LYMPHOCYTE ABSOLUTE #: 1.3 10*3/UL (ref 1.2–3.4)
MEAN CORPUSCULAR HGB CONCENTRATION: 30 % (ref 32–36)
MEAN CORPUSCULAR HGB: 26.7 PG (ref 27–34)
MEAN CORPUSCULAR VOLUME: 88.8 FL (ref 79–95)
MEAN PLATELET VOLUME: 10.2 FL (ref 8.6–12.4)
MONOCYTE ABSOLUTE #: 0.7 10*3/UL (ref 0.2–0.9)
MONOCYTE PERCENT: 10.1 % (ref 4.3–12.9)
NEUTROPHIL ABSOLUTE #: 4.7 10*3/UL (ref 1.4–6.5)
NEUTROPHIL PERCENT: 66.3 % (ref 34–73.5)
NITRITE URINE: NEGATIVE
PH URINE: 6 (ref 5–7)
PLATELET COUNT: 246 10*3/UL (ref 150–400)
POTASSIUM SERPL-SCNC: 4.4 MMOL/L (ref 3.5–5.3)
PROT SERPL-MCNC: 7.2 G/DL (ref 6.4–8.5)
RED BLOOD CELL COUNT: 4.65 10*6/UL (ref 3.7–5.2)
RED CELL DISTRIBUTION WIDTH: 13.9 % (ref 11.3–14.8)
SODIUM SERPL-SCNC: 142 MMOL/L (ref 136–146)
SPECIFIC GRAVITY URINE: 1.01 (ref 1–1.03)
TSH SERPL DL<=0.05 MIU/L-ACNC: 2.42 M[IU]/L (ref 0.3–4.82)
URINE PROTEIN, QUAL (DIPSTICK): NEGATIVE
UROBILINOGEN URINE: <2
WHITE BLOOD CELL COUNT: 7.1 10*3/UL (ref 4–10)

## 2019-12-23 PROCEDURE — 80053 COMPREHEN METABOLIC PANEL: CPT | Performed by: FAMILY MEDICINE

## 2019-12-23 PROCEDURE — 85025 COMPLETE CBC W/AUTO DIFF WBC: CPT | Performed by: FAMILY MEDICINE

## 2019-12-23 PROCEDURE — 36415 COLL VENOUS BLD VENIPUNCTURE: CPT | Performed by: FAMILY MEDICINE

## 2019-12-23 PROCEDURE — 84443 ASSAY THYROID STIM HORMONE: CPT | Performed by: FAMILY MEDICINE

## 2019-12-23 PROCEDURE — 81003 URINALYSIS AUTO W/O SCOPE: CPT | Performed by: FAMILY MEDICINE

## 2019-12-23 PROCEDURE — 99214 OFFICE O/P EST MOD 30 MIN: CPT | Performed by: FAMILY MEDICINE

## 2019-12-23 ASSESSMENT — ENCOUNTER SYMPTOMS
SLEEP DISTURBANCE: 0
CHILLS: 0
VOMITING: 0
COUGH: 0
POLYDIPSIA: 0
NERVOUS/ANXIOUS: 1
APPETITE CHANGE: 0
NAUSEA: 0
EYE REDNESS: 0
BRUISES/BLEEDS EASILY: 0
SORE THROAT: 0
FATIGUE: 0
BLOOD IN STOOL: 0
WHEEZING: 0
SHORTNESS OF BREATH: 0
BACK PAIN: 0
HEADACHES: 0
LIGHT-HEADEDNESS: 0
POLYPHAGIA: 0
FEVER: 0
WEAKNESS: 0
RHINORRHEA: 0
CONSTIPATION: 0
DIZZINESS: 0
EYE DISCHARGE: 0
DIARRHEA: 0

## 2019-12-26 ENCOUNTER — E-ADVICE (OUTPATIENT)
Dept: FAMILY MEDICINE | Age: 84
End: 2019-12-26

## 2019-12-26 DIAGNOSIS — N28.9 FUNCTION KIDNEY DECREASED: Primary | ICD-10-CM

## 2020-01-11 ENCOUNTER — OFFICE VISIT (OUTPATIENT)
Dept: AUDIOLOGY | Age: 85
End: 2020-01-11

## 2020-01-11 ENCOUNTER — OFFICE VISIT (OUTPATIENT)
Dept: OTOLARYNGOLOGY | Age: 85
End: 2020-01-11

## 2020-01-11 ENCOUNTER — EXTERNAL RECORD (OUTPATIENT)
Dept: HEALTH INFORMATION MANAGEMENT | Facility: OTHER | Age: 85
End: 2020-01-11

## 2020-01-11 VITALS — BODY MASS INDEX: 18.78 KG/M2 | WEIGHT: 110 LBS | HEIGHT: 64 IN

## 2020-01-11 DIAGNOSIS — H91.90 HEARING LOSS, UNSPECIFIED HEARING LOSS TYPE, UNSPECIFIED LATERALITY: Primary | ICD-10-CM

## 2020-01-11 DIAGNOSIS — H93.19 TINNITUS, UNSPECIFIED LATERALITY: ICD-10-CM

## 2020-01-11 DIAGNOSIS — H90.3 SENSORINEURAL HEARING LOSS (SNHL) OF BOTH EARS: Primary | ICD-10-CM

## 2020-01-11 PROCEDURE — 92567 TYMPANOMETRY: CPT | Performed by: AUDIOLOGIST

## 2020-01-11 PROCEDURE — 92504 EAR MICROSCOPY EXAMINATION: CPT | Performed by: OTOLARYNGOLOGY

## 2020-01-11 PROCEDURE — 99212 OFFICE O/P EST SF 10 MIN: CPT | Performed by: OTOLARYNGOLOGY

## 2020-01-11 PROCEDURE — 92557 COMPREHENSIVE HEARING TEST: CPT | Performed by: AUDIOLOGIST

## 2020-01-12 ENCOUNTER — E-ADVICE (OUTPATIENT)
Dept: OTOLARYNGOLOGY | Age: 85
End: 2020-01-12

## 2020-01-30 ENCOUNTER — TELEPHONE (OUTPATIENT)
Dept: BEHAVIORAL HEALTH | Age: 85
End: 2020-01-30

## 2020-01-31 ENCOUNTER — E-ADVICE (OUTPATIENT)
Dept: FAMILY MEDICINE | Age: 85
End: 2020-01-31

## 2020-05-12 ENCOUNTER — APPOINTMENT (OUTPATIENT)
Dept: PULMONOLOGY | Age: 85
End: 2020-05-12

## 2020-05-29 ENCOUNTER — APPOINTMENT (OUTPATIENT)
Dept: ULTRASOUND IMAGING | Age: 85
End: 2020-05-29
Attending: FAMILY MEDICINE

## 2020-07-14 ENCOUNTER — APPOINTMENT (OUTPATIENT)
Dept: PULMONOLOGY | Age: 85
End: 2020-07-14

## 2020-07-25 ENCOUNTER — TELEPHONE (OUTPATIENT)
Dept: FAMILY MEDICINE | Age: 85
End: 2020-07-25

## 2020-07-27 ENCOUNTER — LAB SERVICES (OUTPATIENT)
Dept: LAB | Age: 85
End: 2020-07-27

## 2020-07-27 ENCOUNTER — OFFICE VISIT (OUTPATIENT)
Dept: FAMILY MEDICINE | Age: 85
End: 2020-07-27

## 2020-07-27 VITALS
DIASTOLIC BLOOD PRESSURE: 84 MMHG | HEART RATE: 68 BPM | OXYGEN SATURATION: 97 % | WEIGHT: 106 LBS | BODY MASS INDEX: 18.19 KG/M2 | TEMPERATURE: 96.5 F | SYSTOLIC BLOOD PRESSURE: 128 MMHG

## 2020-07-27 DIAGNOSIS — F41.9 ANXIETY: ICD-10-CM

## 2020-07-27 DIAGNOSIS — E78.2 MIXED HYPERLIPIDEMIA: ICD-10-CM

## 2020-07-27 DIAGNOSIS — Z00.00 MEDICARE ANNUAL WELLNESS VISIT, SUBSEQUENT: Primary | ICD-10-CM

## 2020-07-27 DIAGNOSIS — R09.89 LABILE BLOOD PRESSURE: ICD-10-CM

## 2020-07-27 DIAGNOSIS — J84.9 INTERSTITIAL LUNG DISEASE (CMD): ICD-10-CM

## 2020-07-27 DIAGNOSIS — Z00.00 MEDICARE ANNUAL WELLNESS VISIT, SUBSEQUENT: ICD-10-CM

## 2020-07-27 DIAGNOSIS — J84.10 PULMONARY FIBROSIS (CMD): ICD-10-CM

## 2020-07-27 LAB
ALBUMIN SERPL-MCNC: 4.1 G/DL (ref 3.6–5.1)
ALP SERPL-CCNC: 80 U/L (ref 45–130)
ALT SERPL W/O P-5'-P-CCNC: 13 U/L (ref 4–38)
AST SERPL-CCNC: 34 U/L (ref 14–43)
BASOPHIL %: 0.5 % (ref 0–1.2)
BASOPHIL ABSOLUTE #: 0 10*3/UL (ref 0–0.1)
BILIRUB SERPL-MCNC: 0.9 MG/DL (ref 0–1.3)
BUN SERPL-MCNC: 21 MG/DL (ref 7–20)
CALCIUM SERPL-MCNC: 9.6 MG/DL (ref 8.6–10.6)
CHLORIDE SERPL-SCNC: 100 MMOL/L (ref 96–107)
CHOLEST SERPL-MCNC: 184 MG/DL (ref 140–200)
CO2 SERPL-SCNC: 30 MMOL/L (ref 22–32)
CREAT SERPL-MCNC: 0.8 MG/DL (ref 0.5–1.4)
DIFFERENTIAL TYPE: NORMAL
EOSINOPHIL %: 4.4 % (ref 0–10)
EOSINOPHIL ABSOLUTE #: 0.3 10*3/UL (ref 0–0.5)
GFR SERPL CREATININE-BSD FRML MDRD: >60 ML/MIN/{1.73M2}
GFR SERPL CREATININE-BSD FRML MDRD: >60 ML/MIN/{1.73M2}
GLUCOSE P FAST SERPL-MCNC: 99 MG/DL (ref 60–100)
HDLC SERPL-MCNC: 59 MG/DL
HEMATOCRIT: 40.2 % (ref 34–45)
HEMOGLOBIN: 13 G/DL (ref 11.2–15.7)
LDLC SERPL CALC-MCNC: 109 MG/DL (ref 30–100)
LYMPH PERCENT: 21.8 % (ref 20.5–51.1)
LYMPHOCYTE ABSOLUTE #: 1.3 10*3/UL (ref 1.2–3.4)
MEAN CORPUSCULAR HGB CONCENTRATION: 32.3 % (ref 32–36)
MEAN CORPUSCULAR HGB: 28.2 PG (ref 27–34)
MEAN CORPUSCULAR VOLUME: 87.2 FL (ref 79–95)
MEAN PLATELET VOLUME: 10 FL (ref 8.6–12.4)
MONOCYTE ABSOLUTE #: 0.5 10*3/UL (ref 0.2–0.9)
MONOCYTE PERCENT: 8.7 % (ref 4.3–12.9)
NEUTROPHIL ABSOLUTE #: 3.9 10*3/UL (ref 1.4–6.5)
NEUTROPHIL PERCENT: 64.6 % (ref 34–73.5)
PLATELET COUNT: 225 10*3/UL (ref 150–400)
POTASSIUM SERPL-SCNC: 4.4 MMOL/L (ref 3.5–5.3)
PROT SERPL-MCNC: 7.1 G/DL (ref 6.4–8.5)
RED BLOOD CELL COUNT: 4.61 10*6/UL (ref 3.7–5.2)
RED CELL DISTRIBUTION WIDTH: 14 % (ref 11.3–14.8)
SODIUM SERPL-SCNC: 136 MMOL/L (ref 136–146)
TRIGL SERPL-MCNC: 82 MG/DL (ref 0–200)
TSH SERPL DL<=0.05 MIU/L-ACNC: 2.42 M[IU]/L (ref 0.3–4.82)
WHITE BLOOD CELL COUNT: 6.1 10*3/UL (ref 4–10)

## 2020-07-27 PROCEDURE — G0439 PPPS, SUBSEQ VISIT: HCPCS | Performed by: FAMILY MEDICINE

## 2020-07-27 PROCEDURE — 99214 OFFICE O/P EST MOD 30 MIN: CPT | Performed by: FAMILY MEDICINE

## 2020-07-27 PROCEDURE — 84443 ASSAY THYROID STIM HORMONE: CPT | Performed by: FAMILY MEDICINE

## 2020-07-27 PROCEDURE — 85025 COMPLETE CBC W/AUTO DIFF WBC: CPT | Performed by: FAMILY MEDICINE

## 2020-07-27 PROCEDURE — 80061 LIPID PANEL: CPT | Performed by: FAMILY MEDICINE

## 2020-07-27 PROCEDURE — 80053 COMPREHEN METABOLIC PANEL: CPT | Performed by: FAMILY MEDICINE

## 2020-07-27 PROCEDURE — 36415 COLL VENOUS BLD VENIPUNCTURE: CPT | Performed by: FAMILY MEDICINE

## 2020-07-27 ASSESSMENT — COGNITIVE AND FUNCTIONAL STATUS - GENERAL
ARE YOU DEAF OR DO YOU HAVE SERIOUS DIFFICULTY  HEARING: NO
ARE YOU BLIND OR DO YOU HAVE SERIOUS DIFFICULTY SEEING, EVEN WHEN WEARING GLASSES: NO

## 2020-07-27 ASSESSMENT — ENCOUNTER SYMPTOMS
NERVOUS/ANXIOUS: 1
NAUSEA: 0
FEVER: 0
CONSTIPATION: 0
EYE REDNESS: 0
WEAKNESS: 0
APPETITE CHANGE: 0
DIZZINESS: 0
SLEEP DISTURBANCE: 0
VOMITING: 0
SHORTNESS OF BREATH: 0
POLYPHAGIA: 0
BRUISES/BLEEDS EASILY: 0
BLOOD IN STOOL: 0
WHEEZING: 0
COUGH: 0
RHINORRHEA: 0
CHILLS: 0
DIARRHEA: 0
SORE THROAT: 0
FATIGUE: 0
EYE DISCHARGE: 0
LIGHT-HEADEDNESS: 0
HEADACHES: 0
BACK PAIN: 0
POLYDIPSIA: 0

## 2020-07-27 ASSESSMENT — ACTIVITIES OF DAILY LIVING (ADL): SENSORY_SUPPORT_DEVICES: HEARING AIDS

## 2020-07-27 ASSESSMENT — PATIENT HEALTH QUESTIONNAIRE - PHQ9
SUM OF ALL RESPONSES TO PHQ9 QUESTIONS 1 AND 2: 0
CLINICAL INTERPRETATION OF PHQ9 SCORE: NO FURTHER SCREENING NEEDED
1. LITTLE INTEREST OR PLEASURE IN DOING THINGS: NOT AT ALL
CLINICAL INTERPRETATION OF PHQ2 SCORE: NO FURTHER SCREENING NEEDED
SUM OF ALL RESPONSES TO PHQ9 QUESTIONS 1 AND 2: 0
2. FEELING DOWN, DEPRESSED OR HOPELESS: NOT AT ALL

## 2020-08-03 ENCOUNTER — IMAGING SERVICES (OUTPATIENT)
Dept: ULTRASOUND IMAGING | Age: 85
End: 2020-08-03
Attending: FAMILY MEDICINE

## 2020-08-03 DIAGNOSIS — R92.30 DENSE BREASTS: ICD-10-CM

## 2020-08-03 DIAGNOSIS — R93.89 ABNORMAL ULTRASOUND: ICD-10-CM

## 2020-08-03 DIAGNOSIS — R93.89 ABNORMAL ULTRASOUND: Primary | ICD-10-CM

## 2020-08-03 DIAGNOSIS — R92.2 DENSE BREASTS: Primary | ICD-10-CM

## 2020-08-03 DIAGNOSIS — R92.2 DENSE BREASTS: ICD-10-CM

## 2020-08-03 DIAGNOSIS — R92.30 DENSE BREASTS: Primary | ICD-10-CM

## 2020-08-03 PROCEDURE — 76642 ULTRASOUND BREAST LIMITED: CPT | Performed by: RADIOLOGY

## 2020-10-16 ENCOUNTER — IMAGING SERVICES (OUTPATIENT)
Dept: GENERAL RADIOLOGY | Age: 85
End: 2020-10-16
Attending: INTERNAL MEDICINE

## 2020-10-16 ENCOUNTER — OFFICE VISIT (OUTPATIENT)
Dept: PULMONOLOGY | Age: 85
End: 2020-10-16

## 2020-10-16 VITALS
SYSTOLIC BLOOD PRESSURE: 128 MMHG | TEMPERATURE: 97.6 F | HEIGHT: 64 IN | OXYGEN SATURATION: 96 % | BODY MASS INDEX: 18.78 KG/M2 | DIASTOLIC BLOOD PRESSURE: 80 MMHG | WEIGHT: 110 LBS | HEART RATE: 70 BPM | RESPIRATION RATE: 16 BRPM

## 2020-10-16 DIAGNOSIS — J84.9 ILD (INTERSTITIAL LUNG DISEASE) (CMD): ICD-10-CM

## 2020-10-16 DIAGNOSIS — J84.9 ILD (INTERSTITIAL LUNG DISEASE) (CMD): Primary | ICD-10-CM

## 2020-10-16 DIAGNOSIS — Z23 NEED FOR PROPHYLACTIC VACCINATION AND INOCULATION AGAINST INFLUENZA: ICD-10-CM

## 2020-10-16 PROCEDURE — 71046 X-RAY EXAM CHEST 2 VIEWS: CPT | Performed by: RADIOLOGY

## 2020-10-16 PROCEDURE — 90686 IIV4 VACC NO PRSV 0.5 ML IM: CPT

## 2020-10-16 PROCEDURE — G0008 ADMIN INFLUENZA VIRUS VAC: HCPCS

## 2020-10-16 PROCEDURE — 99214 OFFICE O/P EST MOD 30 MIN: CPT | Performed by: INTERNAL MEDICINE

## 2020-10-21 ENCOUNTER — TELEPHONE (OUTPATIENT)
Dept: PULMONOLOGY | Age: 85
End: 2020-10-21

## 2020-10-21 DIAGNOSIS — J84.9 ILD (INTERSTITIAL LUNG DISEASE) (CMD): Primary | ICD-10-CM

## 2020-10-26 ENCOUNTER — OFFICE VISIT (OUTPATIENT)
Dept: PULMONOLOGY | Age: 85
End: 2020-10-26
Attending: INTERNAL MEDICINE

## 2020-10-26 VITALS — HEIGHT: 64 IN | WEIGHT: 111.8 LBS | BODY MASS INDEX: 19.09 KG/M2

## 2020-10-26 DIAGNOSIS — J84.9 ILD (INTERSTITIAL LUNG DISEASE) (CMD): Primary | ICD-10-CM

## 2020-10-26 PROCEDURE — 94729 DIFFUSING CAPACITY: CPT | Performed by: INTERNAL MEDICINE

## 2020-10-26 PROCEDURE — 94010 BREATHING CAPACITY TEST: CPT | Performed by: INTERNAL MEDICINE

## 2020-10-26 PROCEDURE — 94727 GAS DIL/WSHOT DETER LNG VOL: CPT | Performed by: INTERNAL MEDICINE

## 2020-12-14 ENCOUNTER — APPOINTMENT (OUTPATIENT)
Dept: MAMMOGRAPHY | Age: 85
End: 2020-12-14
Attending: FAMILY MEDICINE

## 2020-12-16 ENCOUNTER — TELEPHONE (OUTPATIENT)
Dept: FAMILY MEDICINE | Age: 85
End: 2020-12-16

## 2020-12-22 ENCOUNTER — TELEPHONE (OUTPATIENT)
Dept: FAMILY MEDICINE | Age: 85
End: 2020-12-22

## 2020-12-22 ENCOUNTER — TELEPHONE (OUTPATIENT)
Dept: OTHER | Age: 85
End: 2020-12-22

## 2020-12-22 RX ORDER — BUSPIRONE HYDROCHLORIDE 5 MG/1
5 TABLET ORAL 2 TIMES DAILY
Qty: 30 TABLET | Refills: 0 | Status: SHIPPED | OUTPATIENT
Start: 2020-12-22 | End: 2021-05-24 | Stop reason: ALTCHOICE

## 2020-12-24 ENCOUNTER — TELEPHONE (OUTPATIENT)
Dept: FAMILY MEDICINE | Age: 85
End: 2020-12-24

## 2020-12-29 ENCOUNTER — APPOINTMENT (OUTPATIENT)
Dept: ULTRASOUND IMAGING | Age: 85
End: 2020-12-29
Attending: FAMILY MEDICINE

## 2020-12-29 ENCOUNTER — TELEPHONE (OUTPATIENT)
Dept: FAMILY MEDICINE | Age: 85
End: 2020-12-29

## 2020-12-29 ENCOUNTER — APPOINTMENT (OUTPATIENT)
Dept: MAMMOGRAPHY | Age: 85
End: 2020-12-29
Attending: FAMILY MEDICINE

## 2021-01-12 ENCOUNTER — OFFICE VISIT (OUTPATIENT)
Dept: DERMATOLOGY | Age: 86
End: 2021-01-12

## 2021-01-12 DIAGNOSIS — L82.0 INFLAMED SEBORRHEIC KERATOSIS: ICD-10-CM

## 2021-01-12 DIAGNOSIS — L57.0 ACTINIC KERATOSES: Primary | ICD-10-CM

## 2021-01-12 PROCEDURE — 99213 OFFICE O/P EST LOW 20 MIN: CPT | Performed by: DERMATOLOGY

## 2021-01-12 PROCEDURE — 17110 DESTRUCTION B9 LES UP TO 14: CPT | Performed by: DERMATOLOGY

## 2021-01-12 PROCEDURE — 17000 DESTRUCT PREMALG LESION: CPT | Performed by: DERMATOLOGY

## 2021-02-15 ENCOUNTER — TELEPHONE (OUTPATIENT)
Dept: FAMILY MEDICINE | Age: 86
End: 2021-02-15

## 2021-02-17 ENCOUNTER — TELEPHONE (OUTPATIENT)
Dept: FAMILY MEDICINE | Age: 86
End: 2021-02-17

## 2021-02-22 ENCOUNTER — TELEPHONE (OUTPATIENT)
Dept: OPTOMETRY | Age: 86
End: 2021-02-22

## 2021-02-25 ENCOUNTER — OFFICE VISIT (OUTPATIENT)
Dept: OPTOMETRY | Age: 86
End: 2021-02-25

## 2021-02-25 DIAGNOSIS — H00.14 CHALAZION OF LEFT UPPER EYELID: Primary | ICD-10-CM

## 2021-02-25 PROCEDURE — 99212 OFFICE O/P EST SF 10 MIN: CPT | Performed by: OPTOMETRIST

## 2021-02-25 ASSESSMENT — EXTERNAL EXAM - RIGHT EYE: OD_EXAM: NORMAL

## 2021-02-25 ASSESSMENT — CONF VISUAL FIELD
OD_NORMAL: 1
OS_NORMAL: 1

## 2021-02-25 ASSESSMENT — VISUAL ACUITY
OS_SC: 20/30
OD_SC: 20/40
METHOD: SNELLEN - LINEAR
OS_SC+: -2
OD_SC+: +1

## 2021-02-25 ASSESSMENT — SLIT LAMP EXAM - LIDS
COMMENTS: CHALAZION: UPPER LID
COMMENTS: NORMAL

## 2021-02-25 ASSESSMENT — EXTERNAL EXAM - LEFT EYE: OS_EXAM: NORMAL

## 2021-03-01 ENCOUNTER — TELEPHONE (OUTPATIENT)
Dept: OPHTHALMOLOGY | Age: 86
End: 2021-03-01

## 2021-03-01 ENCOUNTER — APPOINTMENT (OUTPATIENT)
Dept: OPHTHALMOLOGY | Age: 86
End: 2021-03-01

## 2021-03-19 ENCOUNTER — TELEPHONE (OUTPATIENT)
Dept: OPTOMETRY | Age: 86
End: 2021-03-19

## 2021-04-09 ENCOUNTER — OFFICE VISIT (OUTPATIENT)
Dept: OPTOMETRY | Age: 86
End: 2021-04-09

## 2021-04-09 DIAGNOSIS — H52.4 PRESBYOPIA: ICD-10-CM

## 2021-04-09 DIAGNOSIS — Z96.1 S/P CATARACT EXTRACTION AND INSERTION OF INTRAOCULAR LENS, LEFT: ICD-10-CM

## 2021-04-09 DIAGNOSIS — H43.811 VITREOUS DEGENERATION AND DETACHMENT OF RIGHT EYE: ICD-10-CM

## 2021-04-09 DIAGNOSIS — H04.123 BILATERAL DRY EYES: Primary | ICD-10-CM

## 2021-04-09 DIAGNOSIS — Z98.42 S/P CATARACT EXTRACTION AND INSERTION OF INTRAOCULAR LENS, LEFT: ICD-10-CM

## 2021-04-09 DIAGNOSIS — Z98.41 S/P CATARACT EXTRACTION AND INSERTION OF INTRAOCULAR LENS, RIGHT: ICD-10-CM

## 2021-04-09 DIAGNOSIS — Z96.1 S/P CATARACT EXTRACTION AND INSERTION OF INTRAOCULAR LENS, RIGHT: ICD-10-CM

## 2021-04-09 DIAGNOSIS — H52.13 MYOPIA OF BOTH EYES: ICD-10-CM

## 2021-04-09 PROCEDURE — 92015 DETERMINE REFRACTIVE STATE: CPT | Performed by: OPTOMETRIST

## 2021-04-09 PROCEDURE — 92014 COMPRE OPH EXAM EST PT 1/>: CPT | Performed by: OPTOMETRIST

## 2021-04-09 ASSESSMENT — REFRACTION_MANIFEST
OD_ADD: +2.50
OD_SPHERE: -0.25
OS_SPHERE: -0.50
OS_ADD: +2.50

## 2021-04-09 ASSESSMENT — KERATOMETRY
OD_AXISANGLE2_DEGREES: 180
OS_K2POWER_DIOPTERS: 44.50
OD_AXISANGLE_DEGREES: 090
OS_AXISANGLE_DEGREES: 090
OD_K1POWER_DIOPTERS: 42.87
OS_K1POWER_DIOPTERS: 42.75
OD_K2POWER_DIOPTERS: 43.12
OS_AXISANGLE2_DEGREES: 180

## 2021-04-09 ASSESSMENT — VISUAL ACUITY
OS_CC: J3@16
OD_CC: J3@16
METHOD: SNELLEN - LINEAR
OS_SC+: -1
OS_SC: 20/25
OD_SC+: -2
OD_SC: 20/30

## 2021-04-09 ASSESSMENT — TONOMETRY
IOP_METHOD: APPLANATION
OS_IOP_MMHG: 14
OD_IOP_MMHG: 13

## 2021-04-09 ASSESSMENT — EXTERNAL EXAM - RIGHT EYE: OD_EXAM: NORMAL

## 2021-04-09 ASSESSMENT — REFRACTION_WEARINGRX
OS_SPHERE: +2.75
OD_SPHERE: +2.75
SPECS_TYPE: OTC

## 2021-04-09 ASSESSMENT — EXTERNAL EXAM - LEFT EYE: OS_EXAM: NORMAL

## 2021-04-09 ASSESSMENT — CUP TO DISC RATIO
OS_RATIO: 0.4
OD_RATIO: 0.4

## 2021-04-09 ASSESSMENT — CONF VISUAL FIELD
OS_NORMAL: 1
OD_NORMAL: 1

## 2021-04-09 ASSESSMENT — SLIT LAMP EXAM - LIDS
COMMENTS: NORMAL
COMMENTS: NORMAL

## 2021-04-12 ENCOUNTER — APPOINTMENT (OUTPATIENT)
Dept: PULMONOLOGY | Age: 86
End: 2021-04-12

## 2021-04-19 ENCOUNTER — APPOINTMENT (OUTPATIENT)
Dept: PULMONOLOGY | Age: 86
End: 2021-04-19

## 2021-04-27 ENCOUNTER — TELEPHONE (OUTPATIENT)
Dept: PULMONOLOGY | Age: 86
End: 2021-04-27

## 2021-04-27 ENCOUNTER — DOCUMENTATION (OUTPATIENT)
Dept: PULMONOLOGY | Age: 86
End: 2021-04-27

## 2021-05-24 ENCOUNTER — OFFICE VISIT (OUTPATIENT)
Dept: PULMONOLOGY | Age: 86
End: 2021-05-24

## 2021-05-24 DIAGNOSIS — J43.9 PULMONARY EMPHYSEMA, UNSPECIFIED EMPHYSEMA TYPE (CMD): ICD-10-CM

## 2021-05-24 DIAGNOSIS — J84.10 POSTINFLAMMATORY PULMONARY FIBROSIS (CMD): Primary | ICD-10-CM

## 2021-05-24 PROCEDURE — 99214 OFFICE O/P EST MOD 30 MIN: CPT | Performed by: INTERNAL MEDICINE

## 2021-05-24 ASSESSMENT — PAIN SCALES - GENERAL: PAINLEVEL: 0

## 2021-05-27 VITALS
HEART RATE: 71 BPM | TEMPERATURE: 97 F | RESPIRATION RATE: 14 BRPM | SYSTOLIC BLOOD PRESSURE: 132 MMHG | OXYGEN SATURATION: 95 % | DIASTOLIC BLOOD PRESSURE: 68 MMHG | HEIGHT: 65 IN | BODY MASS INDEX: 17.79 KG/M2 | WEIGHT: 106.8 LBS

## 2021-06-12 ENCOUNTER — TELEPHONE (OUTPATIENT)
Dept: DERMATOLOGY | Age: 86
End: 2021-06-12

## 2021-06-24 ENCOUNTER — TELEPHONE (OUTPATIENT)
Dept: DERMATOLOGY | Age: 86
End: 2021-06-24

## 2021-06-30 ENCOUNTER — IMAGING SERVICES (OUTPATIENT)
Dept: CT IMAGING | Age: 86
End: 2021-06-30
Attending: INTERNAL MEDICINE

## 2021-06-30 DIAGNOSIS — J84.10 POSTINFLAMMATORY PULMONARY FIBROSIS (CMD): ICD-10-CM

## 2021-06-30 PROCEDURE — 71250 CT THORAX DX C-: CPT | Performed by: RADIOLOGY

## 2021-07-28 ENCOUNTER — OFFICE VISIT (OUTPATIENT)
Dept: FAMILY MEDICINE | Age: 86
End: 2021-07-28

## 2021-07-28 ENCOUNTER — LAB SERVICES (OUTPATIENT)
Dept: LAB | Age: 86
End: 2021-07-28

## 2021-07-28 VITALS
HEART RATE: 90 BPM | TEMPERATURE: 97 F | DIASTOLIC BLOOD PRESSURE: 80 MMHG | HEIGHT: 65 IN | BODY MASS INDEX: 17.33 KG/M2 | WEIGHT: 104 LBS | SYSTOLIC BLOOD PRESSURE: 140 MMHG

## 2021-07-28 DIAGNOSIS — E78.5 HYPERLIPIDEMIA, UNSPECIFIED HYPERLIPIDEMIA TYPE: ICD-10-CM

## 2021-07-28 DIAGNOSIS — J84.10 PULMONARY FIBROSIS (CMD): ICD-10-CM

## 2021-07-28 DIAGNOSIS — F41.9 ANXIETY: ICD-10-CM

## 2021-07-28 DIAGNOSIS — Z00.00 MEDICARE ANNUAL WELLNESS VISIT, SUBSEQUENT: Primary | ICD-10-CM

## 2021-07-28 DIAGNOSIS — R92.30 DENSE BREAST TISSUE ON MAMMOGRAM: ICD-10-CM

## 2021-07-28 DIAGNOSIS — F43.9 SITUATIONAL STRESS: ICD-10-CM

## 2021-07-28 DIAGNOSIS — J43.9 PULMONARY EMPHYSEMA, UNSPECIFIED EMPHYSEMA TYPE (CMD): ICD-10-CM

## 2021-07-28 DIAGNOSIS — J84.9 INTERSTITIAL LUNG DISEASE (CMD): ICD-10-CM

## 2021-07-28 DIAGNOSIS — Z00.00 MEDICARE ANNUAL WELLNESS VISIT, SUBSEQUENT: ICD-10-CM

## 2021-07-28 LAB
ALBUMIN SERPL-MCNC: 4 G/DL (ref 3.6–5.1)
ALP SERPL-CCNC: 85 U/L (ref 45–130)
ALT SERPL W/O P-5'-P-CCNC: 19 U/L (ref 4–38)
AST SERPL-CCNC: 37 U/L (ref 14–43)
BASOPHIL %: 0.9 % (ref 0–1.2)
BASOPHIL ABSOLUTE #: 0.1 10*3/UL (ref 0–0.1)
BILIRUB SERPL-MCNC: 0.6 MG/DL (ref 0–1.3)
BUN SERPL-MCNC: 17 MG/DL (ref 7–20)
CALCIUM SERPL-MCNC: 9.6 MG/DL (ref 8.6–10.6)
CHLORIDE SERPL-SCNC: 100 MMOL/L (ref 96–107)
CHOLEST SERPL-MCNC: 165 MG/DL (ref 140–200)
CO2 SERPL-SCNC: 30 MMOL/L (ref 22–32)
CREAT SERPL-MCNC: 0.7 MG/DL (ref 0.5–1.4)
DIFFERENTIAL TYPE: ABNORMAL
EOSINOPHIL %: 4.6 % (ref 0–10)
EOSINOPHIL ABSOLUTE #: 0.3 10*3/UL (ref 0–0.5)
GFR SERPL CREATININE-BSD FRML MDRD: >60 ML/MIN/{1.73M2}
GFR SERPL CREATININE-BSD FRML MDRD: >60 ML/MIN/{1.73M2}
GLUCOSE P FAST SERPL-MCNC: 94 MG/DL (ref 60–100)
HDLC SERPL-MCNC: 61 MG/DL
HEMATOCRIT: 40.8 % (ref 34–45)
HEMOGLOBIN: 12.3 G/DL (ref 11.2–15.7)
IMMATURE GRANULOCYTE ABSOLUTE: 0.01 10*3/UL (ref 0–0.05)
IMMATURE GRANULOCYTE PERCENT: 0.2 % (ref 0–0.5)
LDLC SERPL CALC-MCNC: 80 MG/DL (ref 30–100)
LYMPH PERCENT: 17.5 % (ref 20.5–51.1)
LYMPHOCYTE ABSOLUTE #: 1 10*3/UL (ref 1.2–3.4)
MEAN CORPUSCULAR HGB CONCENTRATION: 30.1 % (ref 32–36)
MEAN CORPUSCULAR HGB: 26.5 PG (ref 27–34)
MEAN CORPUSCULAR VOLUME: 87.9 FL (ref 79–95)
MEAN PLATELET VOLUME: 10.8 FL (ref 8.6–12.4)
MONOCYTE ABSOLUTE #: 0.5 10*3/UL (ref 0.2–0.9)
MONOCYTE PERCENT: 8.9 % (ref 4.3–12.9)
NEUTROPHIL ABSOLUTE #: 4 10*3/UL (ref 1.4–6.5)
NEUTROPHIL PERCENT: 67.9 % (ref 34–73.5)
PLATELET COUNT: 235 10*3/UL (ref 150–400)
POTASSIUM SERPL-SCNC: 4.5 MMOL/L (ref 3.5–5.3)
PROT SERPL-MCNC: 7 G/DL (ref 6.4–8.5)
RED BLOOD CELL COUNT: 4.64 10*6/UL (ref 3.7–5.2)
RED CELL DISTRIBUTION WIDTH: 14.1 % (ref 11.3–14.8)
SODIUM SERPL-SCNC: 139 MMOL/L (ref 136–146)
TRIGL SERPL-MCNC: 118 MG/DL (ref 0–200)
TSH SERPL DL<=0.05 MIU/L-ACNC: 2.48 M[IU]/L (ref 0.3–4.82)
WHITE BLOOD CELL COUNT: 5.8 10*3/UL (ref 4–10)

## 2021-07-28 PROCEDURE — 85025 COMPLETE CBC W/AUTO DIFF WBC: CPT | Performed by: FAMILY MEDICINE

## 2021-07-28 PROCEDURE — 84443 ASSAY THYROID STIM HORMONE: CPT | Performed by: FAMILY MEDICINE

## 2021-07-28 PROCEDURE — 80061 LIPID PANEL: CPT | Performed by: FAMILY MEDICINE

## 2021-07-28 PROCEDURE — 36415 COLL VENOUS BLD VENIPUNCTURE: CPT | Performed by: FAMILY MEDICINE

## 2021-07-28 PROCEDURE — 99213 OFFICE O/P EST LOW 20 MIN: CPT | Performed by: FAMILY MEDICINE

## 2021-07-28 PROCEDURE — G0439 PPPS, SUBSEQ VISIT: HCPCS | Performed by: FAMILY MEDICINE

## 2021-07-28 PROCEDURE — 80053 COMPREHEN METABOLIC PANEL: CPT | Performed by: FAMILY MEDICINE

## 2021-07-28 ASSESSMENT — PATIENT HEALTH QUESTIONNAIRE - PHQ9
CLINICAL INTERPRETATION OF PHQ9 SCORE: NO FURTHER SCREENING NEEDED
CLINICAL INTERPRETATION OF PHQ2 SCORE: NO FURTHER SCREENING NEEDED
2. FEELING DOWN, DEPRESSED OR HOPELESS: NOT AT ALL
SUM OF ALL RESPONSES TO PHQ9 QUESTIONS 1 AND 2: 0
SUM OF ALL RESPONSES TO PHQ9 QUESTIONS 1 AND 2: 0
1. LITTLE INTEREST OR PLEASURE IN DOING THINGS: NOT AT ALL

## 2021-07-28 ASSESSMENT — ENCOUNTER SYMPTOMS
NERVOUS/ANXIOUS: 1
LIGHT-HEADEDNESS: 0
EYE DISCHARGE: 0
CHILLS: 0
BACK PAIN: 0
POLYPHAGIA: 0
COUGH: 0
CONSTIPATION: 0
HEADACHES: 0
SLEEP DISTURBANCE: 1
WEAKNESS: 0
FEVER: 0
DIARRHEA: 0
POLYDIPSIA: 0
NAUSEA: 0
SHORTNESS OF BREATH: 0
WHEEZING: 0
VOMITING: 0
BLOOD IN STOOL: 0
FATIGUE: 0
EYE REDNESS: 0
RHINORRHEA: 0
DIZZINESS: 0
BRUISES/BLEEDS EASILY: 0
APPETITE CHANGE: 0
SORE THROAT: 0

## 2021-07-28 ASSESSMENT — ACTIVITIES OF DAILY LIVING (ADL): SENSORY_SUPPORT_DEVICES: HEARING AIDS

## 2021-07-28 ASSESSMENT — COGNITIVE AND FUNCTIONAL STATUS - GENERAL
ARE YOU BLIND OR DO YOU HAVE SERIOUS DIFFICULTY SEEING, EVEN WHEN WEARING GLASSES: NO
ARE YOU DEAF OR DO YOU HAVE SERIOUS DIFFICULTY  HEARING: NO

## 2021-07-30 ENCOUNTER — TELEPHONE (OUTPATIENT)
Dept: FAMILY MEDICINE | Age: 86
End: 2021-07-30

## 2021-07-30 ENCOUNTER — TELEPHONE (OUTPATIENT)
Dept: PULMONOLOGY | Age: 86
End: 2021-07-30

## 2021-08-02 ENCOUNTER — TELEPHONE (OUTPATIENT)
Dept: DERMATOLOGY | Age: 86
End: 2021-08-02

## 2021-08-18 ENCOUNTER — IMAGING SERVICES (OUTPATIENT)
Dept: MAMMOGRAPHY | Age: 86
End: 2021-08-18
Attending: FAMILY MEDICINE

## 2021-08-18 DIAGNOSIS — R92.2 DENSE BREASTS: ICD-10-CM

## 2021-08-18 DIAGNOSIS — R93.89 ABNORMAL ULTRASOUND: ICD-10-CM

## 2021-08-18 DIAGNOSIS — R92.30 DENSE BREASTS: ICD-10-CM

## 2021-08-18 PROCEDURE — 77066 DX MAMMO INCL CAD BI: CPT | Performed by: RADIOLOGY

## 2021-08-23 ENCOUNTER — APPOINTMENT (OUTPATIENT)
Dept: PULMONOLOGY | Age: 86
End: 2021-08-23

## 2021-09-08 ENCOUNTER — TELEPHONE (OUTPATIENT)
Dept: FAMILY MEDICINE | Age: 86
End: 2021-09-08

## 2021-09-23 ENCOUNTER — OFFICE VISIT (OUTPATIENT)
Dept: PULMONOLOGY | Age: 86
End: 2021-09-23

## 2021-09-23 VITALS
SYSTOLIC BLOOD PRESSURE: 166 MMHG | HEART RATE: 73 BPM | WEIGHT: 109 LBS | BODY MASS INDEX: 18.61 KG/M2 | OXYGEN SATURATION: 96 % | DIASTOLIC BLOOD PRESSURE: 90 MMHG | TEMPERATURE: 97.6 F | RESPIRATION RATE: 16 BRPM | HEIGHT: 64 IN

## 2021-09-23 DIAGNOSIS — J84.9 ILD (INTERSTITIAL LUNG DISEASE) (CMD): ICD-10-CM

## 2021-09-23 PROCEDURE — 99214 OFFICE O/P EST MOD 30 MIN: CPT | Performed by: INTERNAL MEDICINE

## 2021-10-15 ENCOUNTER — TELEPHONE (OUTPATIENT)
Dept: FAMILY MEDICINE | Age: 86
End: 2021-10-15

## 2021-10-22 ENCOUNTER — TELEPHONE (OUTPATIENT)
Dept: FAMILY MEDICINE | Age: 86
End: 2021-10-22

## 2021-11-03 ENCOUNTER — TELEPHONE (OUTPATIENT)
Dept: FAMILY MEDICINE | Age: 86
End: 2021-11-03

## 2021-11-09 ENCOUNTER — TELEPHONE (OUTPATIENT)
Dept: FAMILY MEDICINE | Age: 86
End: 2021-11-09

## 2021-11-15 ENCOUNTER — TELEPHONE (OUTPATIENT)
Dept: FAMILY MEDICINE | Age: 86
End: 2021-11-15

## 2021-11-24 ENCOUNTER — TELEPHONE (OUTPATIENT)
Dept: FAMILY MEDICINE | Age: 86
End: 2021-11-24

## 2021-11-24 RX ORDER — LOSARTAN POTASSIUM 25 MG/1
25 TABLET ORAL DAILY
Qty: 30 TABLET | Refills: 3 | Status: SHIPPED | OUTPATIENT
Start: 2021-11-24 | End: 2022-03-16 | Stop reason: SDUPTHER

## 2021-12-06 ENCOUNTER — OFFICE VISIT (OUTPATIENT)
Dept: FAMILY MEDICINE | Age: 86
End: 2021-12-06

## 2021-12-06 ENCOUNTER — LAB SERVICES (OUTPATIENT)
Dept: LAB | Age: 86
End: 2021-12-06

## 2021-12-06 VITALS
TEMPERATURE: 97.2 F | HEART RATE: 78 BPM | DIASTOLIC BLOOD PRESSURE: 82 MMHG | HEIGHT: 64 IN | BODY MASS INDEX: 18.1 KG/M2 | WEIGHT: 106 LBS | SYSTOLIC BLOOD PRESSURE: 142 MMHG

## 2021-12-06 DIAGNOSIS — I10 HYPERTENSION, UNSPECIFIED TYPE: Primary | ICD-10-CM

## 2021-12-06 DIAGNOSIS — F41.1 GAD (GENERALIZED ANXIETY DISORDER): ICD-10-CM

## 2021-12-06 DIAGNOSIS — R09.89 LABILE BLOOD PRESSURE: ICD-10-CM

## 2021-12-06 DIAGNOSIS — I10 HYPERTENSION, UNSPECIFIED TYPE: ICD-10-CM

## 2021-12-06 LAB
BUN SERPL-MCNC: 23 MG/DL (ref 7–20)
CALCIUM SERPL-MCNC: 9.8 MG/DL (ref 8.6–10.6)
CHLORIDE SERPL-SCNC: 101 MMOL/L (ref 96–107)
CO2 SERPL-SCNC: 30 MMOL/L (ref 22–32)
CREAT SERPL-MCNC: 0.8 MG/DL (ref 0.5–1.4)
GFR SERPL CREATININE-BSD FRML MDRD: >60 ML/MIN/{1.73M2}
GFR SERPL CREATININE-BSD FRML MDRD: >60 ML/MIN/{1.73M2}
GLUCOSE SERPL-MCNC: 85 MG/DL (ref 70–200)
POTASSIUM SERPL-SCNC: 4.9 MMOL/L (ref 3.5–5.3)
SODIUM SERPL-SCNC: 138 MMOL/L (ref 136–146)

## 2021-12-06 PROCEDURE — 80048 BASIC METABOLIC PNL TOTAL CA: CPT | Performed by: FAMILY MEDICINE

## 2021-12-06 PROCEDURE — 36415 COLL VENOUS BLD VENIPUNCTURE: CPT | Performed by: FAMILY MEDICINE

## 2021-12-06 PROCEDURE — 99214 OFFICE O/P EST MOD 30 MIN: CPT | Performed by: FAMILY MEDICINE

## 2021-12-06 RX ORDER — SERTRALINE HYDROCHLORIDE 25 MG/1
12.5 TABLET, FILM COATED ORAL DAILY
Qty: 30 TABLET | Refills: 1 | Status: SHIPPED | OUTPATIENT
Start: 2021-12-06 | End: 2022-01-18 | Stop reason: ALTCHOICE

## 2021-12-06 ASSESSMENT — ENCOUNTER SYMPTOMS
UNEXPECTED WEIGHT CHANGE: 0
NERVOUS/ANXIOUS: 1
FEVER: 0

## 2021-12-23 ENCOUNTER — WALK IN (OUTPATIENT)
Dept: URGENT CARE | Age: 86
End: 2021-12-23

## 2021-12-23 ENCOUNTER — TELEPHONE (OUTPATIENT)
Dept: FAMILY MEDICINE | Age: 86
End: 2021-12-23

## 2021-12-23 VITALS
DIASTOLIC BLOOD PRESSURE: 68 MMHG | RESPIRATION RATE: 16 BRPM | HEART RATE: 86 BPM | TEMPERATURE: 97.1 F | SYSTOLIC BLOOD PRESSURE: 122 MMHG | OXYGEN SATURATION: 97 %

## 2021-12-23 DIAGNOSIS — R10.9 ABDOMINAL CRAMPING: Primary | ICD-10-CM

## 2021-12-23 PROCEDURE — 99214 OFFICE O/P EST MOD 30 MIN: CPT | Performed by: FAMILY MEDICINE

## 2021-12-23 RX ORDER — DICYCLOMINE HCL 20 MG
TABLET ORAL
Qty: 15 TABLET | Refills: 0 | Status: SHIPPED | OUTPATIENT
Start: 2021-12-23 | End: 2022-01-02

## 2022-01-18 ENCOUNTER — LAB SERVICES (OUTPATIENT)
Dept: LAB | Age: 87
End: 2022-01-18

## 2022-01-18 ENCOUNTER — OFFICE VISIT (OUTPATIENT)
Dept: FAMILY MEDICINE | Age: 87
End: 2022-01-18

## 2022-01-18 VITALS
TEMPERATURE: 97.1 F | HEIGHT: 64 IN | HEART RATE: 76 BPM | SYSTOLIC BLOOD PRESSURE: 115 MMHG | DIASTOLIC BLOOD PRESSURE: 61 MMHG | WEIGHT: 107.4 LBS | BODY MASS INDEX: 18.34 KG/M2

## 2022-01-18 DIAGNOSIS — I10 HYPERTENSION, UNSPECIFIED TYPE: ICD-10-CM

## 2022-01-18 DIAGNOSIS — F41.1 GAD (GENERALIZED ANXIETY DISORDER): ICD-10-CM

## 2022-01-18 DIAGNOSIS — I10 WHITE COAT SYNDROME WITH DIAGNOSIS OF HYPERTENSION: ICD-10-CM

## 2022-01-18 DIAGNOSIS — R53.83 FATIGUE, UNSPECIFIED TYPE: ICD-10-CM

## 2022-01-18 DIAGNOSIS — J43.9 PULMONARY EMPHYSEMA, UNSPECIFIED EMPHYSEMA TYPE (CMD): ICD-10-CM

## 2022-01-18 DIAGNOSIS — N18.31 STAGE 3A CHRONIC KIDNEY DISEASE (CMD): ICD-10-CM

## 2022-01-18 DIAGNOSIS — I10 HYPERTENSION, UNSPECIFIED TYPE: Primary | ICD-10-CM

## 2022-01-18 LAB
ALBUMIN SERPL-MCNC: 4.3 G/DL (ref 3.6–5.1)
ALP SERPL-CCNC: 89 U/L (ref 45–130)
ALT SERPL W/O P-5'-P-CCNC: 15 U/L (ref 4–38)
AST SERPL-CCNC: 33 U/L (ref 14–43)
BASOPHIL %: 0.8 % (ref 0–1.2)
BASOPHIL ABSOLUTE #: 0.1 10*3/UL (ref 0–0.1)
BILIRUB SERPL-MCNC: 0.6 MG/DL (ref 0–1.3)
BUN SERPL-MCNC: 26 MG/DL (ref 7–20)
CALCIUM SERPL-MCNC: 9.9 MG/DL (ref 8.6–10.6)
CHLORIDE SERPL-SCNC: 103 MMOL/L (ref 96–107)
CO2 SERPL-SCNC: 30 MMOL/L (ref 22–32)
CREAT SERPL-MCNC: 0.9 MG/DL (ref 0.5–1.4)
DIFFERENTIAL TYPE: ABNORMAL
EOSINOPHIL %: 2.5 % (ref 0–10)
EOSINOPHIL ABSOLUTE #: 0.2 10*3/UL (ref 0–0.5)
GFR SERPL CREATININE-BSD FRML MDRD: 59 ML/MIN/{1.73M2}
GFR SERPL CREATININE-BSD FRML MDRD: >60 ML/MIN/{1.73M2}
GLUCOSE SERPL-MCNC: 89 MG/DL (ref 70–200)
HEMATOCRIT: 39.4 % (ref 34–45)
HEMOGLOBIN: 12.2 G/DL (ref 11.2–15.7)
IMMATURE GRANULOCYTE ABSOLUTE: 0.02 10*3/UL (ref 0–0.05)
IMMATURE GRANULOCYTE PERCENT: 0.3 % (ref 0–0.5)
LYMPH PERCENT: 18.4 % (ref 20.5–51.1)
LYMPHOCYTE ABSOLUTE #: 1.5 10*3/UL (ref 1.2–3.4)
MEAN CORPUSCULAR HGB CONCENTRATION: 31 % (ref 32–36)
MEAN CORPUSCULAR HGB: 27.4 PG (ref 27–34)
MEAN CORPUSCULAR VOLUME: 88.3 FL (ref 79–95)
MEAN PLATELET VOLUME: 10.4 FL (ref 8.6–12.4)
MONOCYTE ABSOLUTE #: 0.7 10*3/UL (ref 0.2–0.9)
MONOCYTE PERCENT: 8.6 % (ref 4.3–12.9)
NEUTROPHIL ABSOLUTE #: 5.5 10*3/UL (ref 1.4–6.5)
NEUTROPHIL PERCENT: 69.4 % (ref 34–73.5)
PLATELET COUNT: 241 10*3/UL (ref 150–400)
POTASSIUM SERPL-SCNC: 4.7 MMOL/L (ref 3.5–5.3)
PROT SERPL-MCNC: 7.2 G/DL (ref 6.4–8.5)
RED BLOOD CELL COUNT: 4.46 10*6/UL (ref 3.7–5.2)
RED CELL DISTRIBUTION WIDTH: 14.2 % (ref 11.3–14.8)
SODIUM SERPL-SCNC: 138 MMOL/L (ref 136–146)
TSH SERPL DL<=0.05 MIU/L-ACNC: 2.92 M[IU]/L (ref 0.3–4.82)
WHITE BLOOD CELL COUNT: 7.9 10*3/UL (ref 4–10)

## 2022-01-18 PROCEDURE — 99214 OFFICE O/P EST MOD 30 MIN: CPT | Performed by: FAMILY MEDICINE

## 2022-01-18 PROCEDURE — 85025 COMPLETE CBC W/AUTO DIFF WBC: CPT | Performed by: FAMILY MEDICINE

## 2022-01-18 PROCEDURE — 80053 COMPREHEN METABOLIC PANEL: CPT | Performed by: FAMILY MEDICINE

## 2022-01-18 PROCEDURE — 84443 ASSAY THYROID STIM HORMONE: CPT | Performed by: FAMILY MEDICINE

## 2022-01-18 PROCEDURE — 36415 COLL VENOUS BLD VENIPUNCTURE: CPT | Performed by: FAMILY MEDICINE

## 2022-01-18 ASSESSMENT — PATIENT HEALTH QUESTIONNAIRE - PHQ9
SUM OF ALL RESPONSES TO PHQ9 QUESTIONS 1 AND 2: 0
CLINICAL INTERPRETATION OF PHQ2 SCORE: NO FURTHER SCREENING NEEDED
SUM OF ALL RESPONSES TO PHQ9 QUESTIONS 1 AND 2: 0
2. FEELING DOWN, DEPRESSED OR HOPELESS: NOT AT ALL
1. LITTLE INTEREST OR PLEASURE IN DOING THINGS: NOT AT ALL

## 2022-01-18 ASSESSMENT — ENCOUNTER SYMPTOMS
ROS SKIN COMMENTS: DRY SKIN
SHORTNESS OF BREATH: 0
FATIGUE: 1

## 2022-01-19 ENCOUNTER — TELEPHONE (OUTPATIENT)
Dept: FAMILY MEDICINE | Age: 87
End: 2022-01-19

## 2022-01-19 PROBLEM — N18.31 STAGE 3A CHRONIC KIDNEY DISEASE (CMD): Status: ACTIVE | Noted: 2022-01-19

## 2022-02-03 ENCOUNTER — TELEPHONE (OUTPATIENT)
Dept: FAMILY MEDICINE | Age: 87
End: 2022-02-03

## 2022-03-16 ENCOUNTER — TELEPHONE (OUTPATIENT)
Dept: FAMILY MEDICINE | Age: 87
End: 2022-03-16

## 2022-03-16 RX ORDER — LOSARTAN POTASSIUM 25 MG/1
25 TABLET ORAL DAILY
Qty: 90 TABLET | Refills: 3 | Status: SHIPPED | OUTPATIENT
Start: 2022-03-16 | End: 2023-01-19 | Stop reason: SDUPTHER

## 2022-03-16 RX ORDER — LOSARTAN POTASSIUM 25 MG/1
25 TABLET ORAL DAILY
Qty: 30 TABLET | Refills: 3 | Status: CANCELLED | OUTPATIENT
Start: 2022-03-16

## 2022-04-12 ENCOUNTER — TELEPHONE (OUTPATIENT)
Dept: OPTOMETRY | Age: 87
End: 2022-04-12

## 2022-04-12 ENCOUNTER — OFFICE VISIT (OUTPATIENT)
Dept: OPTOMETRY | Age: 87
End: 2022-04-12

## 2022-04-12 DIAGNOSIS — H52.4 PRESBYOPIA: ICD-10-CM

## 2022-04-12 DIAGNOSIS — H43.811 VITREOUS DEGENERATION AND DETACHMENT OF RIGHT EYE: ICD-10-CM

## 2022-04-12 DIAGNOSIS — Z96.1 S/P CATARACT EXTRACTION AND INSERTION OF INTRAOCULAR LENS, RIGHT: ICD-10-CM

## 2022-04-12 DIAGNOSIS — H16.141 PUNCTATE KERATITIS OF RIGHT EYE: ICD-10-CM

## 2022-04-12 DIAGNOSIS — H04.123 BILATERAL DRY EYES: Primary | ICD-10-CM

## 2022-04-12 DIAGNOSIS — Z98.42 S/P CATARACT EXTRACTION AND INSERTION OF INTRAOCULAR LENS, LEFT: ICD-10-CM

## 2022-04-12 DIAGNOSIS — Z96.1 S/P CATARACT EXTRACTION AND INSERTION OF INTRAOCULAR LENS, LEFT: ICD-10-CM

## 2022-04-12 DIAGNOSIS — Z98.41 S/P CATARACT EXTRACTION AND INSERTION OF INTRAOCULAR LENS, RIGHT: ICD-10-CM

## 2022-04-12 PROCEDURE — 92015 DETERMINE REFRACTIVE STATE: CPT | Performed by: OPTOMETRIST

## 2022-04-12 PROCEDURE — 92014 COMPRE OPH EXAM EST PT 1/>: CPT | Performed by: OPTOMETRIST

## 2022-04-12 ASSESSMENT — SLIT LAMP EXAM - LIDS
COMMENTS: NORMAL
COMMENTS: NORMAL

## 2022-04-12 ASSESSMENT — CONF VISUAL FIELD
OD_NORMAL: 1
OS_NORMAL: 1

## 2022-04-12 ASSESSMENT — EXTERNAL EXAM - LEFT EYE: OS_EXAM: NORMAL

## 2022-04-12 ASSESSMENT — REFRACTION_MANIFEST
OS_ADD: +2.50
OD_SPHERE: PLANO
OS_SPHERE: PLANO
OD_ADD: +2.50

## 2022-04-12 ASSESSMENT — TONOMETRY
OD_IOP_MMHG: 12
IOP_METHOD: APPLANATION
OS_IOP_MMHG: 12

## 2022-04-12 ASSESSMENT — KERATOMETRY
OS_K1POWER_DIOPTERS: 44.00
OS_K2POWER_DIOPTERS: 46.00
OS_AXISANGLE_DEGREES: 075
OD_AXISANGLE_DEGREES: 105
OS_AXISANGLE2_DEGREES: 165
OD_K1POWER_DIOPTERS: 43.50
OD_AXISANGLE2_DEGREES: 015
OD_K2POWER_DIOPTERS: 45.50

## 2022-04-12 ASSESSMENT — VISUAL ACUITY
METHOD: SNELLEN - LINEAR
OD_SC: JAEGER 2
OS_SC: 20/20
OD_SC: 20/20
OD_SC+: -1
OS_SC+: -1

## 2022-04-12 ASSESSMENT — REFRACTION_WEARINGRX
OS_SPHERE: +2.75
OD_SPHERE: +2.75
OD_SPHERE: +1.75
OS_SPHERE: +1.75

## 2022-04-12 ASSESSMENT — EXTERNAL EXAM - RIGHT EYE: OD_EXAM: NORMAL

## 2022-04-14 ENCOUNTER — TELEPHONE (OUTPATIENT)
Dept: FAMILY MEDICINE | Age: 87
End: 2022-04-14

## 2022-05-23 ENCOUNTER — OFFICE VISIT (OUTPATIENT)
Dept: PULMONOLOGY | Age: 87
End: 2022-05-23

## 2022-05-23 ENCOUNTER — IMAGING SERVICES (OUTPATIENT)
Dept: GENERAL RADIOLOGY | Age: 87
End: 2022-05-23
Attending: INTERNAL MEDICINE

## 2022-05-23 VITALS
SYSTOLIC BLOOD PRESSURE: 118 MMHG | HEART RATE: 72 BPM | WEIGHT: 106.48 LBS | RESPIRATION RATE: 16 BRPM | DIASTOLIC BLOOD PRESSURE: 64 MMHG | BODY MASS INDEX: 17.74 KG/M2 | HEIGHT: 65 IN | TEMPERATURE: 97.3 F | OXYGEN SATURATION: 97 %

## 2022-05-23 DIAGNOSIS — J84.9 ILD (INTERSTITIAL LUNG DISEASE) (CMD): Primary | ICD-10-CM

## 2022-05-23 DIAGNOSIS — J84.9 ILD (INTERSTITIAL LUNG DISEASE) (CMD): ICD-10-CM

## 2022-05-23 PROCEDURE — 99214 OFFICE O/P EST MOD 30 MIN: CPT | Performed by: INTERNAL MEDICINE

## 2022-05-23 PROCEDURE — 71046 X-RAY EXAM CHEST 2 VIEWS: CPT | Performed by: RADIOLOGY

## 2022-05-23 ASSESSMENT — PAIN SCALES - GENERAL: PAINLEVEL: 0

## 2022-06-01 ENCOUNTER — OFFICE VISIT (OUTPATIENT)
Dept: PODIATRY | Age: 87
End: 2022-06-01

## 2022-06-01 DIAGNOSIS — L84 CORNS AND CALLOSITIES: ICD-10-CM

## 2022-06-01 DIAGNOSIS — M20.41 HAMMER TOE OF RIGHT FOOT: Primary | ICD-10-CM

## 2022-06-01 PROCEDURE — 99203 OFFICE O/P NEW LOW 30 MIN: CPT | Performed by: PODIATRIST

## 2022-07-29 PROBLEM — I70.0 ATHEROSCLEROSIS OF AORTA (CMD): Status: ACTIVE | Noted: 2022-07-29

## 2022-08-01 ENCOUNTER — OFFICE VISIT (OUTPATIENT)
Dept: FAMILY MEDICINE | Age: 87
End: 2022-08-01

## 2022-08-01 ENCOUNTER — TELEPHONE (OUTPATIENT)
Dept: PULMONOLOGY | Age: 87
End: 2022-08-01

## 2022-08-01 ENCOUNTER — LAB SERVICES (OUTPATIENT)
Dept: LAB | Age: 87
End: 2022-08-01

## 2022-08-01 VITALS
TEMPERATURE: 96.5 F | HEART RATE: 62 BPM | HEIGHT: 65 IN | WEIGHT: 104 LBS | SYSTOLIC BLOOD PRESSURE: 118 MMHG | BODY MASS INDEX: 17.33 KG/M2 | DIASTOLIC BLOOD PRESSURE: 70 MMHG

## 2022-08-01 DIAGNOSIS — I70.0 ATHEROSCLEROSIS OF AORTA (CMD): ICD-10-CM

## 2022-08-01 DIAGNOSIS — J43.9 PULMONARY EMPHYSEMA, UNSPECIFIED EMPHYSEMA TYPE (CMD): ICD-10-CM

## 2022-08-01 DIAGNOSIS — N18.31 STAGE 3A CHRONIC KIDNEY DISEASE (CMD): ICD-10-CM

## 2022-08-01 DIAGNOSIS — J84.9 INTERSTITIAL LUNG DISEASE (CMD): ICD-10-CM

## 2022-08-01 DIAGNOSIS — Z00.00 MEDICARE ANNUAL WELLNESS VISIT, SUBSEQUENT: Primary | ICD-10-CM

## 2022-08-01 DIAGNOSIS — I10 WHITE COAT SYNDROME WITH DIAGNOSIS OF HYPERTENSION: ICD-10-CM

## 2022-08-01 DIAGNOSIS — F41.1 GAD (GENERALIZED ANXIETY DISORDER): ICD-10-CM

## 2022-08-01 DIAGNOSIS — J84.10 PULMONARY FIBROSIS (CMD): ICD-10-CM

## 2022-08-01 DIAGNOSIS — R53.83 FATIGUE, UNSPECIFIED TYPE: ICD-10-CM

## 2022-08-01 DIAGNOSIS — I10 HYPERTENSION, UNSPECIFIED TYPE: ICD-10-CM

## 2022-08-01 DIAGNOSIS — R09.89 LABILE BLOOD PRESSURE: ICD-10-CM

## 2022-08-01 LAB
25(OH)D3 SERPL-MCNC: 53.4 NG/ML (ref 30–100)
ALBUMIN SERPL-MCNC: 4.2 G/DL (ref 3.6–5.1)
ALBUMIN/CREAT UR: 29.7 MG/G
ALP SERPL-CCNC: 90 U/L (ref 45–130)
ALT SERPL W/O P-5'-P-CCNC: 16 U/L (ref 4–38)
AST SERPL-CCNC: 35 U/L (ref 14–43)
BASOPHIL %: 0.5 % (ref 0–1.2)
BASOPHIL ABSOLUTE #: 0 10*3/UL (ref 0–0.1)
BILIRUB SERPL-MCNC: 0.6 MG/DL (ref 0–1.3)
BUN SERPL-MCNC: 23 MG/DL (ref 7–20)
CALCIUM SERPL-MCNC: 9.6 MG/DL (ref 8.6–10.6)
CHLORIDE SERPL-SCNC: 100 MMOL/L (ref 96–107)
CHOLEST SERPL-MCNC: 187 MG/DL (ref 140–200)
CO2 SERPL-SCNC: 30 MMOL/L (ref 22–32)
CREAT SERPL-MCNC: 0.9 MG/DL (ref 0.5–1.4)
CREAT UR-MCNC: 39 MG/DL (ref 30–125)
DIFFERENTIAL TYPE: ABNORMAL
EOSINOPHIL %: 2.3 % (ref 0–10)
EOSINOPHIL ABSOLUTE #: 0.2 10*3/UL (ref 0–0.5)
GFR SERPL CREATININE-BSD FRML MDRD: 59 ML/MIN/{1.73M2}
GFR SERPL CREATININE-BSD FRML MDRD: >60 ML/MIN/{1.73M2}
GLUCOSE P FAST SERPL-MCNC: 92 MG/DL (ref 60–100)
HDLC SERPL-MCNC: 59 MG/DL
HEMATOCRIT: 38.2 % (ref 34–45)
HEMOGLOBIN: 12.1 G/DL (ref 11.2–15.7)
IMMATURE GRANULOCYTE ABSOLUTE: 0.02 10*3/UL (ref 0–0.05)
IMMATURE GRANULOCYTE PERCENT: 0.3 % (ref 0–0.5)
LDLC SERPL CALC-MCNC: 114 MG/DL (ref 30–100)
LYMPH PERCENT: 17.8 % (ref 20.5–51.1)
LYMPHOCYTE ABSOLUTE #: 1.3 10*3/UL (ref 1.2–3.4)
MEAN CORPUSCULAR HGB CONCENTRATION: 31.7 % (ref 32–36)
MEAN CORPUSCULAR HGB: 27.2 PG (ref 27–34)
MEAN CORPUSCULAR VOLUME: 85.8 FL (ref 79–95)
MEAN PLATELET VOLUME: 10.2 FL (ref 8.6–12.4)
MICROALBUMIN UR-MCNC: 11.6 MG/L
MONOCYTE ABSOLUTE #: 0.6 10*3/UL (ref 0.2–0.9)
MONOCYTE PERCENT: 8.5 % (ref 4.3–12.9)
NEUTROPHIL ABSOLUTE #: 5.2 10*3/UL (ref 1.4–6.5)
NEUTROPHIL PERCENT: 70.6 % (ref 34–73.5)
PLATELET COUNT: 249 10*3/UL (ref 150–400)
POTASSIUM SERPL-SCNC: 4.5 MMOL/L (ref 3.5–5.3)
PROT SERPL-MCNC: 7.2 G/DL (ref 6.4–8.5)
RED BLOOD CELL COUNT: 4.45 10*6/UL (ref 3.7–5.2)
RED CELL DISTRIBUTION WIDTH: 14 % (ref 11.3–14.8)
SODIUM SERPL-SCNC: 137 MMOL/L (ref 136–146)
TRIGL SERPL-MCNC: 72 MG/DL (ref 0–200)
TSH SERPL DL<=0.05 MIU/L-ACNC: 2.94 M[IU]/L (ref 0.3–4.82)
WHITE BLOOD CELL COUNT: 7.4 10*3/UL (ref 4–10)

## 2022-08-01 PROCEDURE — 82570 ASSAY OF URINE CREATININE: CPT | Performed by: FAMILY MEDICINE

## 2022-08-01 PROCEDURE — 80061 LIPID PANEL: CPT | Performed by: FAMILY MEDICINE

## 2022-08-01 PROCEDURE — 80053 COMPREHEN METABOLIC PANEL: CPT | Performed by: FAMILY MEDICINE

## 2022-08-01 PROCEDURE — G0439 PPPS, SUBSEQ VISIT: HCPCS | Performed by: FAMILY MEDICINE

## 2022-08-01 PROCEDURE — 82306 VITAMIN D 25 HYDROXY: CPT | Performed by: FAMILY MEDICINE

## 2022-08-01 PROCEDURE — 82043 UR ALBUMIN QUANTITATIVE: CPT | Performed by: FAMILY MEDICINE

## 2022-08-01 PROCEDURE — 85025 COMPLETE CBC W/AUTO DIFF WBC: CPT | Performed by: FAMILY MEDICINE

## 2022-08-01 PROCEDURE — 84443 ASSAY THYROID STIM HORMONE: CPT | Performed by: FAMILY MEDICINE

## 2022-08-01 PROCEDURE — 36415 COLL VENOUS BLD VENIPUNCTURE: CPT | Performed by: FAMILY MEDICINE

## 2022-08-01 PROCEDURE — 99214 OFFICE O/P EST MOD 30 MIN: CPT | Performed by: FAMILY MEDICINE

## 2022-08-01 ASSESSMENT — ENCOUNTER SYMPTOMS
SHORTNESS OF BREATH: 1
BLOOD IN STOOL: 0
FATIGUE: 0
BACK PAIN: 0
HEADACHES: 0
POLYDIPSIA: 0
BRUISES/BLEEDS EASILY: 0
EYE REDNESS: 0
SLEEP DISTURBANCE: 0
WEAKNESS: 0
NERVOUS/ANXIOUS: 0
CHILLS: 0
RHINORRHEA: 0
CONSTIPATION: 0
DIZZINESS: 0
WHEEZING: 0
LIGHT-HEADEDNESS: 0
DIARRHEA: 0
NAUSEA: 0
FEVER: 0
POLYPHAGIA: 0
COUGH: 0
VOMITING: 0
APPETITE CHANGE: 0
EYE DISCHARGE: 0
SORE THROAT: 0

## 2022-08-01 ASSESSMENT — ACTIVITIES OF DAILY LIVING (ADL): SENSORY_SUPPORT_DEVICES: HEARING AIDS

## 2022-08-01 ASSESSMENT — PATIENT HEALTH QUESTIONNAIRE - PHQ9
SUM OF ALL RESPONSES TO PHQ9 QUESTIONS 1 AND 2: 0
CLINICAL INTERPRETATION OF PHQ2 SCORE: NO FURTHER SCREENING NEEDED
2. FEELING DOWN, DEPRESSED OR HOPELESS: NOT AT ALL
SUM OF ALL RESPONSES TO PHQ9 QUESTIONS 1 AND 2: 0
1. LITTLE INTEREST OR PLEASURE IN DOING THINGS: NOT AT ALL

## 2022-11-22 ENCOUNTER — APPOINTMENT (OUTPATIENT)
Dept: PULMONOLOGY | Age: 87
End: 2022-11-22

## 2023-01-19 ENCOUNTER — TELEPHONE (OUTPATIENT)
Dept: FAMILY MEDICINE | Age: 88
End: 2023-01-19

## 2023-01-19 RX ORDER — LOSARTAN POTASSIUM 25 MG/1
25 TABLET ORAL DAILY
Qty: 90 TABLET | Refills: 3 | OUTPATIENT
Start: 2023-01-19

## 2023-01-19 RX ORDER — LOSARTAN POTASSIUM 25 MG/1
25 TABLET ORAL DAILY
Qty: 90 TABLET | Refills: 0 | Status: SHIPPED | OUTPATIENT
Start: 2023-01-19 | End: 2023-02-09 | Stop reason: DRUGHIGH

## 2023-01-24 ENCOUNTER — TELEPHONE (OUTPATIENT)
Dept: PULMONOLOGY | Age: 88
End: 2023-01-24

## 2023-01-25 ENCOUNTER — TELEPHONE (OUTPATIENT)
Dept: FAMILY MEDICINE | Age: 88
End: 2023-01-25

## 2023-01-26 ENCOUNTER — APPOINTMENT (OUTPATIENT)
Dept: DERMATOLOGY | Age: 88
End: 2023-01-26

## 2023-02-01 ENCOUNTER — TELEPHONE (OUTPATIENT)
Dept: FAMILY MEDICINE | Age: 88
End: 2023-02-01

## 2023-02-02 ENCOUNTER — APPOINTMENT (OUTPATIENT)
Dept: CT IMAGING | Facility: HOSPITAL | Age: 88
End: 2023-02-02
Attending: STUDENT IN AN ORGANIZED HEALTH CARE EDUCATION/TRAINING PROGRAM
Payer: MEDICARE

## 2023-02-02 ENCOUNTER — IMAGING SERVICES (OUTPATIENT)
Dept: OTHER | Age: 88
End: 2023-02-02

## 2023-02-02 ENCOUNTER — HOSPITAL ENCOUNTER (INPATIENT)
Facility: HOSPITAL | Age: 88
LOS: 2 days | Discharge: HOME | End: 2023-02-04
Attending: STUDENT IN AN ORGANIZED HEALTH CARE EDUCATION/TRAINING PROGRAM | Admitting: HOSPITALIST
Payer: MEDICARE

## 2023-02-02 ENCOUNTER — APPOINTMENT (OUTPATIENT)
Dept: GENERAL RADIOLOGY | Facility: HOSPITAL | Age: 88
End: 2023-02-02
Attending: STUDENT IN AN ORGANIZED HEALTH CARE EDUCATION/TRAINING PROGRAM
Payer: MEDICARE

## 2023-02-02 ENCOUNTER — OFFICE VISIT (OUTPATIENT)
Dept: PULMONOLOGY | Age: 88
End: 2023-02-02

## 2023-02-02 ENCOUNTER — TELEPHONE (OUTPATIENT)
Dept: FAMILY MEDICINE | Age: 88
End: 2023-02-02

## 2023-02-02 ENCOUNTER — TELEPHONE (OUTPATIENT)
Dept: PULMONOLOGY | Age: 88
End: 2023-02-02

## 2023-02-02 VITALS
OXYGEN SATURATION: 94 % | HEART RATE: 77 BPM | BODY MASS INDEX: 16.56 KG/M2 | SYSTOLIC BLOOD PRESSURE: 110 MMHG | RESPIRATION RATE: 12 BRPM | WEIGHT: 97 LBS | HEIGHT: 64 IN | DIASTOLIC BLOOD PRESSURE: 72 MMHG | TEMPERATURE: 96 F

## 2023-02-02 DIAGNOSIS — D73.89 SPLENIC LESION: ICD-10-CM

## 2023-02-02 DIAGNOSIS — J96.01 ACUTE RESPIRATORY FAILURE WITH HYPOXIA (CMD): ICD-10-CM

## 2023-02-02 DIAGNOSIS — R10.9 ABDOMINAL PAIN, ACUTE: ICD-10-CM

## 2023-02-02 DIAGNOSIS — D64.9 ANEMIA, UNSPECIFIED TYPE: ICD-10-CM

## 2023-02-02 DIAGNOSIS — R09.02 HYPOXIA: Primary | ICD-10-CM

## 2023-02-02 DIAGNOSIS — R18.8 OTHER ASCITES: ICD-10-CM

## 2023-02-02 DIAGNOSIS — E87.1 HYPONATREMIA: ICD-10-CM

## 2023-02-02 LAB
ADENOVIRUS PCR:: NOT DETECTED
ALBUMIN SERPL-MCNC: 2.7 G/DL (ref 3.4–5)
ALBUMIN/GLOB SERPL: 0.6 {RATIO} (ref 1–2)
ALP LIVER SERPL-CCNC: 111 U/L
ALT SERPL-CCNC: 27 U/L
ANION GAP SERPL CALC-SCNC: 3 MMOL/L (ref 0–18)
AST SERPL-CCNC: 39 U/L (ref 15–37)
ATRIAL RATE: 75 BPM
B PARAPERT DNA SPEC QL NAA+PROBE: NOT DETECTED
B PERT DNA SPEC QL NAA+PROBE: NOT DETECTED
BASOPHILS # BLD AUTO: 0.03 X10(3) UL (ref 0–0.2)
BASOPHILS NFR BLD AUTO: 0.3 %
BILIRUB SERPL-MCNC: 0.7 MG/DL (ref 0.1–2)
BUN BLD-MCNC: 26 MG/DL (ref 7–18)
C PNEUM DNA SPEC QL NAA+PROBE: NOT DETECTED
CALCIUM BLD-MCNC: 9.1 MG/DL (ref 8.5–10.1)
CHLORIDE SERPL-SCNC: 105 MMOL/L (ref 98–112)
CO2 SERPL-SCNC: 24 MMOL/L (ref 21–32)
CORONAVIRUS 229E PCR:: NOT DETECTED
CORONAVIRUS HKU1 PCR:: NOT DETECTED
CORONAVIRUS NL63 PCR:: NOT DETECTED
CORONAVIRUS OC43 PCR:: NOT DETECTED
CREAT BLD-MCNC: 0.86 MG/DL
D DIMER PPP FEU-MCNC: 1.61 UG/ML FEU (ref ?–0.88)
DEPRECATED HBV CORE AB SER IA-ACNC: 216.6 NG/ML
EOSINOPHIL # BLD AUTO: 0.01 X10(3) UL (ref 0–0.7)
EOSINOPHIL NFR BLD AUTO: 0.1 %
ERYTHROCYTE [DISTWIDTH] IN BLOOD BY AUTOMATED COUNT: 14.5 %
FLUAV RNA SPEC QL NAA+PROBE: NOT DETECTED
FLUBV RNA SPEC QL NAA+PROBE: NOT DETECTED
GFR SERPLBLD BASED ON 1.73 SQ M-ARVRAT: 65 ML/MIN/1.73M2 (ref 60–?)
GLOBULIN PLAS-MCNC: 4.6 G/DL (ref 2.8–4.4)
GLUCOSE BLD-MCNC: 118 MG/DL (ref 70–99)
HCT VFR BLD AUTO: 35.7 %
HGB BLD-MCNC: 10.9 G/DL
IMM GRANULOCYTES # BLD AUTO: 0.04 X10(3) UL (ref 0–1)
IMM GRANULOCYTES NFR BLD: 0.5 %
IRON SATN MFR SERPL: 13 %
IRON SERPL-MCNC: 38 UG/DL
LACTATE SERPL-SCNC: 0.9 MMOL/L (ref 0.4–2)
LIPASE SERPL-CCNC: 96 U/L (ref 73–393)
LYMPHOCYTES # BLD AUTO: 0.62 X10(3) UL (ref 1–4)
LYMPHOCYTES NFR BLD AUTO: 7 %
MCH RBC QN AUTO: 24.5 PG (ref 26–34)
MCHC RBC AUTO-ENTMCNC: 30.5 G/DL (ref 31–37)
MCV RBC AUTO: 80.2 FL
METAPNEUMOVIRUS PCR:: NOT DETECTED
MONOCYTES # BLD AUTO: 0.27 X10(3) UL (ref 0.1–1)
MONOCYTES NFR BLD AUTO: 3.1 %
MYCOPLASMA PNEUMONIA PCR:: NOT DETECTED
NEUTROPHILS # BLD AUTO: 7.86 X10 (3) UL (ref 1.5–7.7)
NEUTROPHILS # BLD AUTO: 7.86 X10(3) UL (ref 1.5–7.7)
NEUTROPHILS NFR BLD AUTO: 89 %
OSMOLALITY SERPL CALC.SUM OF ELEC: 280 MOSM/KG (ref 275–295)
P AXIS: 49 DEGREES
P-R INTERVAL: 134 MS
PARAINFLUENZA 1 PCR:: NOT DETECTED
PARAINFLUENZA 2 PCR:: NOT DETECTED
PARAINFLUENZA 3 PCR:: NOT DETECTED
PARAINFLUENZA 4 PCR:: NOT DETECTED
PLATELET # BLD AUTO: 462 10(3)UL (ref 150–450)
POTASSIUM SERPL-SCNC: 5.1 MMOL/L (ref 3.5–5.1)
PROT SERPL-MCNC: 7.3 G/DL (ref 6.4–8.2)
Q-T INTERVAL: 386 MS
QRS DURATION: 78 MS
QTC CALCULATION (BEZET): 431 MS
R AXIS: 2 DEGREES
RBC # BLD AUTO: 4.45 X10(6)UL
RHINOVIRUS/ENTERO PCR:: NOT DETECTED
RSV RNA SPEC QL NAA+PROBE: NOT DETECTED
SARS-COV-2 RNA NPH QL NAA+NON-PROBE: NOT DETECTED
SARS-COV-2 RNA RESP QL NAA+PROBE: NOT DETECTED
SODIUM SERPL-SCNC: 132 MMOL/L (ref 136–145)
T AXIS: 23 DEGREES
TIBC SERPL-MCNC: 292 UG/DL (ref 240–450)
TRANSFERRIN SERPL-MCNC: 196 MG/DL (ref 200–360)
TROPONIN I HIGH SENSITIVITY: 6 NG/L
VENTRICULAR RATE: 75 BPM
WBC # BLD AUTO: 8.8 X10(3) UL (ref 4–11)

## 2023-02-02 PROCEDURE — 71045 X-RAY EXAM CHEST 1 VIEW: CPT | Performed by: STUDENT IN AN ORGANIZED HEALTH CARE EDUCATION/TRAINING PROGRAM

## 2023-02-02 PROCEDURE — 74177 CT ABD & PELVIS W/CONTRAST: CPT | Performed by: STUDENT IN AN ORGANIZED HEALTH CARE EDUCATION/TRAINING PROGRAM

## 2023-02-02 PROCEDURE — 71275 CT ANGIOGRAPHY CHEST: CPT | Performed by: STUDENT IN AN ORGANIZED HEALTH CARE EDUCATION/TRAINING PROGRAM

## 2023-02-02 PROCEDURE — 99215 OFFICE O/P EST HI 40 MIN: CPT | Performed by: INTERNAL MEDICINE

## 2023-02-02 PROCEDURE — 99223 1ST HOSP IP/OBS HIGH 75: CPT | Performed by: HOSPITALIST

## 2023-02-02 RX ORDER — ASPIRIN 81 MG/1
81 TABLET ORAL DAILY
COMMUNITY

## 2023-02-02 RX ORDER — RIBOFLAVIN (VITAMIN B2) 100 MG
100 TABLET ORAL DAILY
COMMUNITY

## 2023-02-02 RX ORDER — BISACODYL 10 MG
10 SUPPOSITORY, RECTAL RECTAL
Status: DISCONTINUED | OUTPATIENT
Start: 2023-02-02 | End: 2023-02-04

## 2023-02-02 RX ORDER — BENZONATATE 100 MG/1
100 CAPSULE ORAL 3 TIMES DAILY PRN
Status: DISCONTINUED | OUTPATIENT
Start: 2023-02-02 | End: 2023-02-04

## 2023-02-02 RX ORDER — MELATONIN
3 NIGHTLY PRN
Status: DISCONTINUED | OUTPATIENT
Start: 2023-02-02 | End: 2023-02-04

## 2023-02-02 RX ORDER — SODIUM CHLORIDE 9 MG/ML
125 INJECTION, SOLUTION INTRAVENOUS CONTINUOUS
Status: DISCONTINUED | OUTPATIENT
Start: 2023-02-02 | End: 2023-02-03

## 2023-02-02 RX ORDER — ASPIRIN 81 MG/1
81 TABLET ORAL DAILY
Status: DISCONTINUED | OUTPATIENT
Start: 2023-02-02 | End: 2023-02-04

## 2023-02-02 RX ORDER — BENZONATATE 200 MG/1
200 CAPSULE ORAL 3 TIMES DAILY PRN
Status: DISCONTINUED | OUTPATIENT
Start: 2023-02-02 | End: 2023-02-04

## 2023-02-02 RX ORDER — ONDANSETRON 2 MG/ML
8 INJECTION INTRAMUSCULAR; INTRAVENOUS EVERY 6 HOURS PRN
Status: DISCONTINUED | OUTPATIENT
Start: 2023-02-02 | End: 2023-02-04

## 2023-02-02 RX ORDER — LOSARTAN POTASSIUM 25 MG/1
TABLET ORAL DAILY
COMMUNITY

## 2023-02-02 RX ORDER — SENNOSIDES 8.6 MG
17.2 TABLET ORAL NIGHTLY PRN
Status: DISCONTINUED | OUTPATIENT
Start: 2023-02-02 | End: 2023-02-04

## 2023-02-02 RX ORDER — ACETAMINOPHEN 500 MG
500 TABLET ORAL EVERY 4 HOURS PRN
Status: DISCONTINUED | OUTPATIENT
Start: 2023-02-02 | End: 2023-02-04

## 2023-02-02 RX ORDER — ECHINACEA PURPUREA EXTRACT 125 MG
1 TABLET ORAL
Status: DISCONTINUED | OUTPATIENT
Start: 2023-02-02 | End: 2023-02-04

## 2023-02-02 RX ORDER — ENOXAPARIN SODIUM 100 MG/ML
30 INJECTION SUBCUTANEOUS DAILY
Status: DISCONTINUED | OUTPATIENT
Start: 2023-02-02 | End: 2023-02-04

## 2023-02-02 RX ORDER — LEVOFLOXACIN 5 MG/ML
750 INJECTION, SOLUTION INTRAVENOUS
Status: DISCONTINUED | OUTPATIENT
Start: 2023-02-02 | End: 2023-02-04

## 2023-02-02 RX ORDER — POLYETHYLENE GLYCOL 3350 17 G/17G
17 POWDER, FOR SOLUTION ORAL DAILY PRN
Status: DISCONTINUED | OUTPATIENT
Start: 2023-02-02 | End: 2023-02-04

## 2023-02-02 RX ORDER — LOSARTAN POTASSIUM 25 MG/1
25 TABLET ORAL DAILY
Status: DISCONTINUED | OUTPATIENT
Start: 2023-02-03 | End: 2023-02-04

## 2023-02-02 NOTE — ED INITIAL ASSESSMENT (HPI)
Patient arrives to the ED via [facility] from [address] with c/o abd pain s/p eating tacos last noc for dinner. Patient incidentally was also hypoxic at the IC with a SpO2 fo 78% on RA. Patient was placed on 6L NC and increased to high 90s. Patient has significant pulmonary hx.

## 2023-02-02 NOTE — ED QUICK NOTES
Orders for admission, patient is aware of plan and ready to go upstairs. Any questions, please call ED BETO Bolaños at extension [phone].      Patient Covid vaccination status: Fully vaccinated     COVID Test Ordered in ED: Rapid SARS-CoV-2 by PCR    COVID Suspicion at Admission: No risk with negative rapid    Running Infusions:  None    Mental Status/LOC at time of transport: A&Ox4    Other pertinent information:   CIWA score: N/A   NIH score:  N/A

## 2023-02-02 NOTE — PROGRESS NOTES
NURSING ADMISSION NOTE      Patient admitted via Cart  Oriented to room. Safety precautions initiated. Bed in low position. Call light in reach. Received pt from ED. AOx4. RA. Navigator completed w/ pt. Admission orders received. No further needs at this time. appropriate for situation

## 2023-02-03 ENCOUNTER — APPOINTMENT (OUTPATIENT)
Dept: CV DIAGNOSTICS | Facility: HOSPITAL | Age: 88
End: 2023-02-03
Attending: HOSPITALIST
Payer: MEDICARE

## 2023-02-03 ENCOUNTER — APPOINTMENT (OUTPATIENT)
Dept: ULTRASOUND IMAGING | Facility: HOSPITAL | Age: 88
End: 2023-02-03
Attending: INTERNAL MEDICINE
Payer: MEDICARE

## 2023-02-03 ENCOUNTER — EXTERNAL RECORD (OUTPATIENT)
Dept: HEALTH INFORMATION MANAGEMENT | Facility: OTHER | Age: 88
End: 2023-02-03

## 2023-02-03 ENCOUNTER — APPOINTMENT (OUTPATIENT)
Dept: ULTRASOUND IMAGING | Facility: HOSPITAL | Age: 88
End: 2023-02-03
Payer: MEDICARE

## 2023-02-03 LAB
ANION GAP SERPL CALC-SCNC: 5 MMOL/L (ref 0–18)
BUN BLD-MCNC: 19 MG/DL (ref 7–18)
CALCIUM BLD-MCNC: 8.4 MG/DL (ref 8.5–10.1)
CHLORIDE SERPL-SCNC: 109 MMOL/L (ref 98–112)
CO2 SERPL-SCNC: 24 MMOL/L (ref 21–32)
CREAT BLD-MCNC: 0.72 MG/DL
DEPRECATED HBV CORE AB SER IA-ACNC: 202.6 NG/ML
ERYTHROCYTE [DISTWIDTH] IN BLOOD BY AUTOMATED COUNT: 14.6 %
GFR SERPLBLD BASED ON 1.73 SQ M-ARVRAT: 80 ML/MIN/1.73M2 (ref 60–?)
GLUCOSE BLD-MCNC: 94 MG/DL (ref 70–99)
HCT VFR BLD AUTO: 28.2 %
HGB BLD-MCNC: 8.7 G/DL
INR BLD: 1.18 (ref 0.85–1.16)
IRON SATN MFR SERPL: 19 %
IRON SERPL-MCNC: 42 UG/DL
MCH RBC QN AUTO: 24.7 PG (ref 26–34)
MCHC RBC AUTO-ENTMCNC: 30.9 G/DL (ref 31–37)
MCV RBC AUTO: 80.1 FL
OSMOLALITY SERPL CALC.SUM OF ELEC: 288 MOSM/KG (ref 275–295)
PLATELET # BLD AUTO: 367 10(3)UL (ref 150–450)
POTASSIUM SERPL-SCNC: 4 MMOL/L (ref 3.5–5.1)
PROCALCITONIN SERPL-MCNC: <0.05 NG/ML (ref ?–0.16)
PROTHROMBIN TIME: 15 SECONDS (ref 11.6–14.8)
RBC # BLD AUTO: 3.52 X10(6)UL
SODIUM SERPL-SCNC: 138 MMOL/L (ref 136–145)
TIBC SERPL-MCNC: 221 UG/DL (ref 240–450)
TRANSFERRIN SERPL-MCNC: 148 MG/DL (ref 200–360)
WBC # BLD AUTO: 7.1 X10(3) UL (ref 4–11)

## 2023-02-03 PROCEDURE — 76705 ECHO EXAM OF ABDOMEN: CPT | Performed by: INTERNAL MEDICINE

## 2023-02-03 PROCEDURE — 93975 VASCULAR STUDY: CPT

## 2023-02-03 PROCEDURE — 93306 TTE W/DOPPLER COMPLETE: CPT | Performed by: HOSPITALIST

## 2023-02-03 PROCEDURE — 76700 US EXAM ABDOM COMPLETE: CPT

## 2023-02-03 PROCEDURE — 99232 SBSQ HOSP IP/OBS MODERATE 35: CPT | Performed by: INTERNAL MEDICINE

## 2023-02-03 NOTE — IMAGING NOTE
Alexis Calderon RN aware patient needs INR stat and clear liquids starting at 1000 and continue holding Lovenox.

## 2023-02-03 NOTE — PLAN OF CARE
Pt AOx4. VSS on RA. Very [place]. Tele, NSR. Lovenox. Scds. Voids. Denies pain. SBA w walker to bathroom. Saline locked. Iv abx Q48. Clear liq diet for paracentesis this afternoon. Pending ultrasound abdomen for spleen mass. Pt and son updated on poc, no further needs.       Problem: Patient/Family Goals  Goal: Patient/Family Long Term Goal  Description: Patient's Long Term Goal: Discharge home    Interventions:  - Levaquin  - Paracentesis in AM  - Consults to Hosp, Pulm, GI  - See additional Care Plan goals for specific interventions  Outcome: Progressing  Goal: Patient/Family Short Term Goal  Description: Patient's Short Term Goal: manage pain  2/3: get paracentesis done   Interventions:   - poc  - See additional Care Plan goals for specific interventions  Outcome: Progressing

## 2023-02-03 NOTE — PROGRESS NOTES
[facility] Note:  Renal Adjustment for levofloxacin [facility]    Mary Azevedo is a 80year old patient who has been prescribed levofloxacin (LEVAQUIN) 750 mg every 24 hrs. The estimated creatinine clearance is 32.8 mL/min (based on SCr of 0.86 mg/dL). The dose has been adjusted to levofloxacin (LEVAQUIN) 750 mg every 48 hrs per hospital renal dose adjustment protocol for treatment of pneumonia. Pharmacy will follow and adjust dose as warranted for additional renal function changes.     Thank you,    Anastacio Nava, PharmD  2/2/2023  8:55 PM

## 2023-02-03 NOTE — PROGRESS NOTES
02/03/23 1100   Mobility   O2 walk?  Yes   SPO2% on Room Air at Rest 99   At rest oxygen flow (liters per minute) 0   SPO2% Ambulation on Room Air 89   Ambulation oxygen flow (liters per minute) 0

## 2023-02-03 NOTE — PLAN OF CARE
Pt A&O X 4. Forgetful at times. [place]. RA. IV Abx. NSR on Tele. SCDs. Voids. IVF. No C/O abdominal pain. NPO at midnight except sips with meds for paracentesis in AM. SBA. No further needs at this time.      Problem: Patient/Family Goals  Goal: Patient/Family Long Term Goal  Description: Patient's Long Term Goal: Discharge home    Interventions:  - Levaquin  - Paracentesis in AM  - Consults to Hosp, Pulm, GI  - See additional Care Plan goals for specific interventions  Outcome: Progressing  Goal: Patient/Family Short Term Goal  Description: Patient's Short Term Goal: manage pain    Interventions:   - poc  - See additional Care Plan goals for specific interventions  Outcome: Progressing

## 2023-02-04 VITALS
DIASTOLIC BLOOD PRESSURE: 62 MMHG | BODY MASS INDEX: 17.32 KG/M2 | HEART RATE: 87 BPM | HEIGHT: 64.02 IN | SYSTOLIC BLOOD PRESSURE: 125 MMHG | OXYGEN SATURATION: 98 % | TEMPERATURE: 98 F | RESPIRATION RATE: 16 BRPM | WEIGHT: 101.44 LBS

## 2023-02-04 PROCEDURE — 99239 HOSP IP/OBS DSCHRG MGMT >30: CPT | Performed by: INTERNAL MEDICINE

## 2023-02-04 NOTE — PROGRESS NOTES
Problem: Hypoxia    Data: Pt is A&Ox4. . Hearing aids at bedside. Glasses at bedside. . Room air with O2 sats >90%. On tele, NSR. Tolerating diet. Voids. Up SBA with a walker. No c/o pain  Or Nausea, vomiting or diarrhea. Intervention: Paracentesis not done per dayshift RN. No fluid drained. Edu: Pt updated on POC. Bed alarms on. Call light within reach. All questions answered at this time.

## 2023-02-04 NOTE — CM/SW NOTE
[facility] accepted pt for home oxygen. Sw called [facility] and requested that they provide pt with portable tank. Sw spoke to son Dolores Montoya- he is from [place] and will be staying with pt until tomorrow. Her daughter lices out of state also and will be coming in tonight and will be staying with pt for awhile. Discussed Home O2 with pt's on.     Colin Dimas LCSW  /Discharge Planner

## 2023-02-04 NOTE — DISCHARGE INSTRUCTIONS
It was recommended that you use oxygen at home to facilitate your recovery and compliment your treatment. The [facility] team has set up delivery with [facility]. Contact information: [address], [facility]. Phone: [phone]. .  The estimated delivery is today. Wear 2 L of oxygen with activity.

## 2023-02-04 NOTE — PROGRESS NOTES
NURSING DISCHARGE NOTE    Discharged Home via Wheelchair. Accompanied by Family member and Support staff  Belongings Taken by patient/family. IV discontinued. Went over discharge instructions with pt and family. They verbalized understanding. All discharge paperwork was sent with the patient.

## 2023-02-04 NOTE — PLAN OF CARE
Problem: Patient/Family Goals  Goal: Patient/Family Long Term Goal  Description: Patient's Long Term Goal: Discharge home    Interventions:  - Levaquin  - Paracentesis in AM  - Consults to Hosp, Pulm, GI  - See additional Care Plan goals for specific interventions  Outcome: Adequate for Discharge  Goal: Patient/Family Short Term Goal  Description: Patient's Short Term Goal: manage pain  2/3: get paracentesis done   Interventions:   - poc  - See additional Care Plan goals for specific interventions  Outcome: Adequate for Discharge

## 2023-02-04 NOTE — PLAN OF CARE
Problem: Patient/Family Goals  Goal: Patient/Family Long Term Goal  Description: Patient's Long Term Goal: Discharge home    Interventions:  - Levaquin  - Paracentesis in AM  - Consults to Hosp, Pulm, GI  - See additional Care Plan goals for specific interventions  Outcome: Progressing  Goal: Patient/Family Short Term Goal  Description: Patient's Short Term Goal: manage pain  2/3: get paracentesis done   Interventions:   - poc  - See additional Care Plan goals for specific interventions  Outcome: Progressing

## 2023-02-04 NOTE — PROGRESS NOTES
SP02% on room air at rest:95%    Spo2 Ambulation on room air: 84%    Spo2 Ambulation on o2: 95%            On: 2 Liters Per Minute

## 2023-02-04 NOTE — CM/SW NOTE
Order received for home oxygen- referral sent to [facility]- await acceptance.     Yasmeen Ramirez LCSW  /Discharge Planner

## 2023-02-07 ENCOUNTER — TELEPHONE (OUTPATIENT)
Dept: FAMILY MEDICINE | Age: 88
End: 2023-02-07

## 2023-02-08 ENCOUNTER — TELEPHONE (OUTPATIENT)
Dept: PULMONOLOGY | Age: 88
End: 2023-02-08

## 2023-02-09 ENCOUNTER — TELEPHONE (OUTPATIENT)
Dept: INTERNAL MEDICINE | Age: 88
End: 2023-02-09

## 2023-02-09 ENCOUNTER — OFFICE VISIT (OUTPATIENT)
Dept: INTERNAL MEDICINE | Age: 88
End: 2023-02-09

## 2023-02-09 VITALS
DIASTOLIC BLOOD PRESSURE: 78 MMHG | TEMPERATURE: 97.4 F | SYSTOLIC BLOOD PRESSURE: 128 MMHG | OXYGEN SATURATION: 96 % | HEART RATE: 80 BPM

## 2023-02-09 DIAGNOSIS — E44.1 MILD PROTEIN-CALORIE MALNUTRITION (CMD): ICD-10-CM

## 2023-02-09 DIAGNOSIS — Z23 NEED FOR PNEUMOCOCCAL VACCINATION: ICD-10-CM

## 2023-02-09 DIAGNOSIS — R09.02 HYPOXIA: ICD-10-CM

## 2023-02-09 DIAGNOSIS — J84.9 ILD (INTERSTITIAL LUNG DISEASE) (CMD): ICD-10-CM

## 2023-02-09 DIAGNOSIS — N18.31 STAGE 3A CHRONIC KIDNEY DISEASE (CMD): ICD-10-CM

## 2023-02-09 DIAGNOSIS — E87.1 HYPONATREMIA: ICD-10-CM

## 2023-02-09 DIAGNOSIS — R53.1 WEAKNESS GENERALIZED: ICD-10-CM

## 2023-02-09 DIAGNOSIS — I70.0 ATHEROSCLEROSIS OF AORTA (CMD): ICD-10-CM

## 2023-02-09 DIAGNOSIS — R09.02 HYPOXIA: Primary | ICD-10-CM

## 2023-02-09 DIAGNOSIS — J43.9 PULMONARY EMPHYSEMA, UNSPECIFIED EMPHYSEMA TYPE (CMD): ICD-10-CM

## 2023-02-09 DIAGNOSIS — J84.10 PULMONARY FIBROSIS (CMD): Primary | ICD-10-CM

## 2023-02-09 PROCEDURE — 90677 PCV20 VACCINE IM: CPT | Performed by: INTERNAL MEDICINE

## 2023-02-09 PROCEDURE — G0009 ADMIN PNEUMOCOCCAL VACCINE: HCPCS | Performed by: INTERNAL MEDICINE

## 2023-02-09 PROCEDURE — 99496 TRANSJ CARE MGMT HIGH F2F 7D: CPT | Performed by: INTERNAL MEDICINE

## 2023-02-09 ASSESSMENT — PATIENT HEALTH QUESTIONNAIRE - PHQ9
CLINICAL INTERPRETATION OF PHQ2 SCORE: NO FURTHER SCREENING NEEDED
SUM OF ALL RESPONSES TO PHQ9 QUESTIONS 1 AND 2: 0
2. FEELING DOWN, DEPRESSED OR HOPELESS: NOT AT ALL
SUM OF ALL RESPONSES TO PHQ9 QUESTIONS 1 AND 2: 0
1. LITTLE INTEREST OR PLEASURE IN DOING THINGS: NOT AT ALL

## 2023-02-10 ENCOUNTER — TELEPHONE (OUTPATIENT)
Dept: INTERNAL MEDICINE | Age: 88
End: 2023-02-10

## 2023-02-11 ENCOUNTER — EXTERNAL RECORD (OUTPATIENT)
Dept: HEALTH INFORMATION MANAGEMENT | Facility: OTHER | Age: 88
End: 2023-02-11

## 2023-02-13 ENCOUNTER — EXTERNAL RECORD (OUTPATIENT)
Dept: HEALTH INFORMATION MANAGEMENT | Facility: OTHER | Age: 88
End: 2023-02-13

## 2023-02-14 ENCOUNTER — E-ADVICE (OUTPATIENT)
Dept: FAMILY MEDICINE | Age: 88
End: 2023-02-14

## 2023-02-20 ENCOUNTER — APPOINTMENT (OUTPATIENT)
Dept: DERMATOLOGY | Age: 88
End: 2023-02-20

## 2023-02-22 ENCOUNTER — EXTERNAL RECORD (OUTPATIENT)
Dept: HEALTH INFORMATION MANAGEMENT | Facility: OTHER | Age: 88
End: 2023-02-22

## 2023-02-22 ENCOUNTER — TELEPHONE (OUTPATIENT)
Dept: INTERNAL MEDICINE | Age: 88
End: 2023-02-22

## 2023-02-24 ENCOUNTER — TELEPHONE (OUTPATIENT)
Dept: INTERNAL MEDICINE | Age: 88
End: 2023-02-24

## 2023-02-27 ENCOUNTER — OFFICE VISIT (OUTPATIENT)
Dept: DERMATOLOGY | Age: 88
End: 2023-02-27

## 2023-02-27 DIAGNOSIS — L82.0 INFLAMED SEBORRHEIC KERATOSIS: ICD-10-CM

## 2023-02-27 DIAGNOSIS — L71.9 ROSACEA: Primary | ICD-10-CM

## 2023-02-27 PROCEDURE — 17110 DESTRUCTION B9 LES UP TO 14: CPT | Performed by: DERMATOLOGY

## 2023-02-27 PROCEDURE — 99213 OFFICE O/P EST LOW 20 MIN: CPT | Performed by: DERMATOLOGY

## 2023-03-14 ENCOUNTER — TELEPHONE (OUTPATIENT)
Dept: INTERNAL MEDICINE | Age: 88
End: 2023-03-14

## 2023-03-23 ENCOUNTER — EXTERNAL RECORD (OUTPATIENT)
Dept: HEALTH INFORMATION MANAGEMENT | Facility: OTHER | Age: 88
End: 2023-03-23

## 2023-04-10 ENCOUNTER — EXTERNAL RECORD (OUTPATIENT)
Dept: HEALTH INFORMATION MANAGEMENT | Facility: OTHER | Age: 88
End: 2023-04-10

## 2023-04-13 ENCOUNTER — OFFICE VISIT (OUTPATIENT)
Dept: OPTOMETRY | Age: 88
End: 2023-04-13

## 2023-04-13 DIAGNOSIS — Z98.41 S/P CATARACT EXTRACTION AND INSERTION OF INTRAOCULAR LENS, RIGHT: ICD-10-CM

## 2023-04-13 DIAGNOSIS — H43.811 VITREOUS DEGENERATION AND DETACHMENT OF RIGHT EYE: ICD-10-CM

## 2023-04-13 DIAGNOSIS — H52.223 REGULAR ASTIGMATISM OF BOTH EYES: ICD-10-CM

## 2023-04-13 DIAGNOSIS — Z96.1 S/P CATARACT EXTRACTION AND INSERTION OF INTRAOCULAR LENS, LEFT: ICD-10-CM

## 2023-04-13 DIAGNOSIS — H52.4 PRESBYOPIA: ICD-10-CM

## 2023-04-13 DIAGNOSIS — Z96.1 S/P CATARACT EXTRACTION AND INSERTION OF INTRAOCULAR LENS, RIGHT: ICD-10-CM

## 2023-04-13 DIAGNOSIS — H04.123 BILATERAL DRY EYES: Primary | ICD-10-CM

## 2023-04-13 DIAGNOSIS — Z98.42 S/P CATARACT EXTRACTION AND INSERTION OF INTRAOCULAR LENS, LEFT: ICD-10-CM

## 2023-04-13 PROCEDURE — 92015 DETERMINE REFRACTIVE STATE: CPT | Performed by: OPTOMETRIST

## 2023-04-13 PROCEDURE — 92014 COMPRE OPH EXAM EST PT 1/>: CPT | Performed by: OPTOMETRIST

## 2023-04-13 ASSESSMENT — REFRACTION_MANIFEST
OS_ADD: +2.50
OD_SPHERE: -0.50
OS_CYLINDER: +0.50
OS_AXIS: 180
OD_AXIS: 175
OS_SPHERE: -0.50
OD_CYLINDER: +0.50
OD_ADD: +2.50

## 2023-04-13 ASSESSMENT — REFRACTION_WEARINGRX
OS_CYLINDER: SPHERE
OD_SPHERE: +2.50
SPECS_TYPE: OTC READERS
OS_SPHERE: +2.50
OD_CYLINDER: SPHERE

## 2023-04-13 ASSESSMENT — VISUAL ACUITY
OD_SC: 20/25
METHOD: SNELLEN - LINEAR
OS_SC+: +2
OD_CC: JAEGER 1
OS_SC: 20/25
OD_SC+: -2

## 2023-04-13 ASSESSMENT — CONF VISUAL FIELD
OD_NORMAL: 1
OD_INFERIOR_TEMPORAL_RESTRICTION: 0
OS_SUPERIOR_NASAL_RESTRICTION: 0
OS_INFERIOR_TEMPORAL_RESTRICTION: 0
OS_SUPERIOR_TEMPORAL_RESTRICTION: 0
OD_SUPERIOR_TEMPORAL_RESTRICTION: 0
OS_INFERIOR_NASAL_RESTRICTION: 0
OD_INFERIOR_NASAL_RESTRICTION: 0
OD_SUPERIOR_NASAL_RESTRICTION: 0
OS_NORMAL: 1

## 2023-04-13 ASSESSMENT — KERATOMETRY
OS_AXISANGLE_DEGREES: 090
OS_AXISANGLE2_DEGREES: 180
OD_K1POWER_DIOPTERS: 43.50
OD_AXISANGLE_DEGREES: 090
OS_K1POWER_DIOPTERS: 43.50
OD_AXISANGLE2_DEGREES: 180
OD_K2POWER_DIOPTERS: 42.75
OS_K2POWER_DIOPTERS: 42.75

## 2023-04-13 ASSESSMENT — EXTERNAL EXAM - LEFT EYE: OS_EXAM: NORMAL

## 2023-04-13 ASSESSMENT — TONOMETRY
OD_IOP_MMHG: 10
OS_IOP_MMHG: 12

## 2023-04-13 ASSESSMENT — SLIT LAMP EXAM - LIDS
COMMENTS: NORMAL
COMMENTS: NORMAL

## 2023-04-13 ASSESSMENT — CUP TO DISC RATIO
OS_RATIO: 0.4
OD_RATIO: 0.4

## 2023-04-13 ASSESSMENT — EXTERNAL EXAM - RIGHT EYE: OD_EXAM: NORMAL

## 2023-05-25 ENCOUNTER — APPOINTMENT (OUTPATIENT)
Dept: INTERNAL MEDICINE | Age: 88
End: 2023-05-25

## 2023-05-25 ENCOUNTER — OFFICE VISIT (OUTPATIENT)
Dept: INTERNAL MEDICINE | Age: 88
End: 2023-05-25

## 2023-05-25 VITALS
DIASTOLIC BLOOD PRESSURE: 78 MMHG | WEIGHT: 97 LBS | BODY MASS INDEX: 16.56 KG/M2 | HEIGHT: 64 IN | SYSTOLIC BLOOD PRESSURE: 132 MMHG | HEART RATE: 72 BPM | TEMPERATURE: 96.8 F

## 2023-05-25 DIAGNOSIS — E78.49 OTHER HYPERLIPIDEMIA: ICD-10-CM

## 2023-05-25 DIAGNOSIS — Z78.0 MENOPAUSE: Primary | ICD-10-CM

## 2023-05-25 DIAGNOSIS — E55.9 VITAMIN D DEFICIENCY: ICD-10-CM

## 2023-05-25 PROCEDURE — 99215 OFFICE O/P EST HI 40 MIN: CPT | Performed by: INTERNAL MEDICINE

## 2023-05-25 ASSESSMENT — PATIENT HEALTH QUESTIONNAIRE - PHQ9
1. LITTLE INTEREST OR PLEASURE IN DOING THINGS: NOT AT ALL
CLINICAL INTERPRETATION OF PHQ2 SCORE: NO FURTHER SCREENING NEEDED
2. FEELING DOWN, DEPRESSED OR HOPELESS: NOT AT ALL
SUM OF ALL RESPONSES TO PHQ9 QUESTIONS 1 AND 2: 0
SUM OF ALL RESPONSES TO PHQ9 QUESTIONS 1 AND 2: 0

## 2023-05-31 ENCOUNTER — TELEPHONE (OUTPATIENT)
Dept: INTERNAL MEDICINE | Age: 88
End: 2023-05-31

## 2023-06-05 ENCOUNTER — TELEPHONE (OUTPATIENT)
Dept: INTERNAL MEDICINE | Age: 88
End: 2023-06-05

## 2023-06-15 ENCOUNTER — TELEPHONE (OUTPATIENT)
Dept: INTERNAL MEDICINE | Age: 88
End: 2023-06-15

## 2023-07-14 ENCOUNTER — TELEPHONE (OUTPATIENT)
Dept: INTERNAL MEDICINE | Age: 88
End: 2023-07-14

## 2023-07-18 ENCOUNTER — TELEPHONE (OUTPATIENT)
Dept: INTERNAL MEDICINE | Age: 88
End: 2023-07-18

## 2023-07-20 ENCOUNTER — LAB SERVICES (OUTPATIENT)
Dept: LAB | Age: 88
End: 2023-07-20

## 2023-07-20 DIAGNOSIS — E78.49 OTHER HYPERLIPIDEMIA: ICD-10-CM

## 2023-07-20 DIAGNOSIS — E55.9 VITAMIN D DEFICIENCY: ICD-10-CM

## 2023-07-20 LAB
25(OH)D3+25(OH)D2 SERPL-MCNC: 77.7 NG/ML (ref 30–100)
ALBUMIN SERPL-MCNC: 3.4 G/DL (ref 3.6–5.1)
ALBUMIN/GLOB SERPL: 0.9 {RATIO} (ref 1–2.4)
ALP SERPL-CCNC: 82 UNITS/L (ref 45–117)
ALT SERPL-CCNC: 21 UNITS/L
ANION GAP SERPL CALC-SCNC: 11 MMOL/L (ref 7–19)
AST SERPL-CCNC: 25 UNITS/L
BASOPHILS # BLD: 0 K/MCL (ref 0–0.3)
BASOPHILS NFR BLD: 1 %
BILIRUB SERPL-MCNC: 0.7 MG/DL (ref 0.2–1)
BUN SERPL-MCNC: 22 MG/DL (ref 6–20)
BUN/CREAT SERPL: 24 (ref 7–25)
CALCIUM SERPL-MCNC: 8.8 MG/DL (ref 8.4–10.2)
CHLORIDE SERPL-SCNC: 100 MMOL/L (ref 97–110)
CHOLEST SERPL-MCNC: 173 MG/DL
CHOLEST/HDLC SERPL: 2.7 {RATIO}
CO2 SERPL-SCNC: 30 MMOL/L (ref 21–32)
CREAT SERPL-MCNC: 0.91 MG/DL (ref 0.51–0.95)
DEPRECATED RDW RBC: 48.5 FL (ref 39–50)
EOSINOPHIL # BLD: 0.2 K/MCL (ref 0–0.5)
EOSINOPHIL NFR BLD: 2 %
ERYTHROCYTE [DISTWIDTH] IN BLOOD: 15.2 % (ref 11–15)
FASTING DURATION TIME PATIENT: ABNORMAL H
GFR SERPLBLD BASED ON 1.73 SQ M-ARVRAT: 60 ML/MIN
GLOBULIN SER-MCNC: 3.6 G/DL (ref 2–4)
GLUCOSE SERPL-MCNC: 84 MG/DL (ref 70–99)
HCT VFR BLD CALC: 38.3 % (ref 36–46.5)
HDLC SERPL-MCNC: 64 MG/DL
HGB BLD-MCNC: 11.6 G/DL (ref 12–15.5)
IMM GRANULOCYTES # BLD AUTO: 0 K/MCL (ref 0–0.2)
IMM GRANULOCYTES # BLD: 0 %
LDLC SERPL CALC-MCNC: 97 MG/DL
LYMPHOCYTES # BLD: 1.2 K/MCL (ref 1–4)
LYMPHOCYTES NFR BLD: 15 %
MCH RBC QN AUTO: 26.3 PG (ref 26–34)
MCHC RBC AUTO-ENTMCNC: 30.3 G/DL (ref 32–36.5)
MCV RBC AUTO: 86.8 FL (ref 78–100)
MONOCYTES # BLD: 0.6 K/MCL (ref 0.3–0.9)
MONOCYTES NFR BLD: 7 %
NEUTROPHILS # BLD: 6.2 K/MCL (ref 1.8–7.7)
NEUTROPHILS NFR BLD: 75 %
NONHDLC SERPL-MCNC: 109 MG/DL
NRBC BLD MANUAL-RTO: 0 /100 WBC
PLATELET # BLD AUTO: 252 K/MCL (ref 140–450)
POTASSIUM SERPL-SCNC: 4.3 MMOL/L (ref 3.4–5.1)
PROT SERPL-MCNC: 7 G/DL (ref 6.4–8.2)
RBC # BLD: 4.41 MIL/MCL (ref 4–5.2)
SODIUM SERPL-SCNC: 137 MMOL/L (ref 135–145)
TRIGL SERPL-MCNC: 59 MG/DL
WBC # BLD: 8.3 K/MCL (ref 4.2–11)

## 2023-07-20 PROCEDURE — 80053 COMPREHEN METABOLIC PANEL: CPT | Performed by: INTERNAL MEDICINE

## 2023-07-20 PROCEDURE — 85025 COMPLETE CBC W/AUTO DIFF WBC: CPT | Performed by: INTERNAL MEDICINE

## 2023-07-20 PROCEDURE — 80061 LIPID PANEL: CPT | Performed by: INTERNAL MEDICINE

## 2023-07-20 PROCEDURE — 82306 VITAMIN D 25 HYDROXY: CPT | Performed by: INTERNAL MEDICINE

## 2023-07-20 PROCEDURE — 36415 COLL VENOUS BLD VENIPUNCTURE: CPT | Performed by: INTERNAL MEDICINE

## 2023-07-24 ENCOUNTER — TELEPHONE (OUTPATIENT)
Dept: INTERNAL MEDICINE | Age: 88
End: 2023-07-24

## 2023-07-27 ENCOUNTER — TELEPHONE (OUTPATIENT)
Dept: INTERNAL MEDICINE | Age: 88
End: 2023-07-27

## 2023-08-01 ENCOUNTER — APPOINTMENT (OUTPATIENT)
Dept: FAMILY MEDICINE | Age: 88
End: 2023-08-01

## 2023-08-02 ENCOUNTER — OFFICE VISIT (OUTPATIENT)
Dept: INTERNAL MEDICINE | Age: 88
End: 2023-08-02

## 2023-08-02 ENCOUNTER — LAB SERVICES (OUTPATIENT)
Dept: LAB | Age: 88
End: 2023-08-02

## 2023-08-02 VITALS
WEIGHT: 94 LBS | TEMPERATURE: 97.8 F | SYSTOLIC BLOOD PRESSURE: 128 MMHG | BODY MASS INDEX: 16.66 KG/M2 | HEART RATE: 80 BPM | OXYGEN SATURATION: 98 % | HEIGHT: 63 IN | DIASTOLIC BLOOD PRESSURE: 74 MMHG

## 2023-08-02 DIAGNOSIS — N39.41 URGE INCONTINENCE OF URINE: ICD-10-CM

## 2023-08-02 DIAGNOSIS — I10 ESSENTIAL HYPERTENSION: ICD-10-CM

## 2023-08-02 DIAGNOSIS — Z00.00 MEDICARE ANNUAL WELLNESS VISIT, SUBSEQUENT: Primary | ICD-10-CM

## 2023-08-02 DIAGNOSIS — K59.09 OTHER CONSTIPATION: ICD-10-CM

## 2023-08-02 PROBLEM — H26.9 CATARACT: Status: ACTIVE | Noted: 2023-08-02

## 2023-08-02 PROBLEM — M81.0 OSTEOPOROSIS: Status: ACTIVE | Noted: 2023-08-02

## 2023-08-02 PROBLEM — N18.2 CKD (CHRONIC KIDNEY DISEASE) STAGE 2, GFR 60-89 ML/MIN: Status: ACTIVE | Noted: 2023-08-02

## 2023-08-02 PROBLEM — H91.90 HEARING LOSS: Status: ACTIVE | Noted: 2023-08-02

## 2023-08-02 LAB
APPEARANCE UR: CLEAR
BACTERIA #/AREA URNS HPF: ABNORMAL /HPF
BILIRUB UR QL STRIP: NEGATIVE
COLOR UR: YELLOW
GLUCOSE UR STRIP-MCNC: NEGATIVE MG/DL
HGB UR QL STRIP: NEGATIVE
HYALINE CASTS #/AREA URNS LPF: ABNORMAL /LPF
KETONES UR STRIP-MCNC: NEGATIVE MG/DL
LEUKOCYTE ESTERASE UR QL STRIP: ABNORMAL
MUCOUS THREADS URNS QL MICRO: PRESENT
NITRITE UR QL STRIP: NEGATIVE
PH UR STRIP: 7 [PH] (ref 5–7)
PROT UR STRIP-MCNC: NEGATIVE MG/DL
RBC #/AREA URNS HPF: ABNORMAL /HPF
SP GR UR STRIP: 1.01 (ref 1–1.03)
SQUAMOUS #/AREA URNS HPF: ABNORMAL /HPF
UROBILINOGEN UR STRIP-MCNC: 0.2 MG/DL
WBC #/AREA URNS HPF: ABNORMAL /HPF

## 2023-08-02 PROCEDURE — 87086 URINE CULTURE/COLONY COUNT: CPT | Performed by: CLINICAL MEDICAL LABORATORY

## 2023-08-02 PROCEDURE — 99214 OFFICE O/P EST MOD 30 MIN: CPT | Performed by: INTERNAL MEDICINE

## 2023-08-02 PROCEDURE — 81001 URINALYSIS AUTO W/SCOPE: CPT | Performed by: INTERNAL MEDICINE

## 2023-08-02 PROCEDURE — G0439 PPPS, SUBSEQ VISIT: HCPCS | Performed by: INTERNAL MEDICINE

## 2023-08-04 LAB — BACTERIA UR CULT: NORMAL

## 2023-11-06 ENCOUNTER — TELEPHONE (OUTPATIENT)
Dept: INTERNAL MEDICINE | Age: 88
End: 2023-11-06

## 2024-02-06 ENCOUNTER — APPOINTMENT (OUTPATIENT)
Dept: INTERNAL MEDICINE | Age: 89
End: 2024-02-06

## 2024-02-06 ENCOUNTER — LAB SERVICES (OUTPATIENT)
Dept: LAB | Age: 89
End: 2024-02-06

## 2024-02-06 ENCOUNTER — OFFICE VISIT (OUTPATIENT)
Dept: INTERNAL MEDICINE | Age: 89
End: 2024-02-06

## 2024-02-06 VITALS
DIASTOLIC BLOOD PRESSURE: 82 MMHG | HEIGHT: 63 IN | BODY MASS INDEX: 16.66 KG/M2 | SYSTOLIC BLOOD PRESSURE: 128 MMHG | TEMPERATURE: 97.3 F | HEART RATE: 84 BPM | WEIGHT: 94 LBS

## 2024-02-06 DIAGNOSIS — E44.1 MILD PROTEIN-CALORIE MALNUTRITION (CMD): ICD-10-CM

## 2024-02-06 DIAGNOSIS — N18.31 STAGE 3A CHRONIC KIDNEY DISEASE (CMD): ICD-10-CM

## 2024-02-06 DIAGNOSIS — I70.0 ATHEROSCLEROSIS OF AORTA (CMD): ICD-10-CM

## 2024-02-06 DIAGNOSIS — J43.9 PULMONARY EMPHYSEMA, UNSPECIFIED EMPHYSEMA TYPE (CMD): ICD-10-CM

## 2024-02-06 DIAGNOSIS — J84.9 INTERSTITIAL PULMONARY DISEASE, UNSPECIFIED (CMD): Primary | ICD-10-CM

## 2024-02-06 PROCEDURE — 85025 COMPLETE CBC W/AUTO DIFF WBC: CPT | Performed by: INTERNAL MEDICINE

## 2024-02-06 PROCEDURE — 80053 COMPREHEN METABOLIC PANEL: CPT | Performed by: INTERNAL MEDICINE

## 2024-02-06 PROCEDURE — 36415 COLL VENOUS BLD VENIPUNCTURE: CPT | Performed by: INTERNAL MEDICINE

## 2024-02-06 ASSESSMENT — PATIENT HEALTH QUESTIONNAIRE - PHQ9
SUM OF ALL RESPONSES TO PHQ9 QUESTIONS 1 AND 2: 0
SUM OF ALL RESPONSES TO PHQ9 QUESTIONS 1 AND 2: 0
CLINICAL INTERPRETATION OF PHQ2 SCORE: NO FURTHER SCREENING NEEDED
2. FEELING DOWN, DEPRESSED OR HOPELESS: NOT AT ALL
1. LITTLE INTEREST OR PLEASURE IN DOING THINGS: NOT AT ALL

## 2024-02-07 LAB
ALBUMIN SERPL-MCNC: 3.6 G/DL (ref 3.6–5.1)
ALBUMIN/GLOB SERPL: 0.9 {RATIO} (ref 1–2.4)
ALP SERPL-CCNC: 98 UNITS/L (ref 45–117)
ALT SERPL-CCNC: 25 UNITS/L
ANION GAP SERPL CALC-SCNC: 12 MMOL/L (ref 7–19)
AST SERPL-CCNC: 31 UNITS/L
BASOPHILS # BLD: 0.1 K/MCL (ref 0–0.3)
BASOPHILS NFR BLD: 1 %
BILIRUB SERPL-MCNC: 0.5 MG/DL (ref 0.2–1)
BUN SERPL-MCNC: 21 MG/DL (ref 6–20)
BUN/CREAT SERPL: 28 (ref 7–25)
CALCIUM SERPL-MCNC: 10 MG/DL (ref 8.4–10.2)
CHLORIDE SERPL-SCNC: 97 MMOL/L (ref 97–110)
CO2 SERPL-SCNC: 29 MMOL/L (ref 21–32)
CREAT SERPL-MCNC: 0.75 MG/DL (ref 0.51–0.95)
DEPRECATED RDW RBC: 45.3 FL (ref 39–50)
EGFRCR SERPLBLD CKD-EPI 2021: 76 ML/MIN/{1.73_M2}
EOSINOPHIL # BLD: 0.1 K/MCL (ref 0–0.5)
EOSINOPHIL NFR BLD: 1 %
ERYTHROCYTE [DISTWIDTH] IN BLOOD: 14.8 % (ref 11–15)
FASTING DURATION TIME PATIENT: ABNORMAL H
GLOBULIN SER-MCNC: 3.9 G/DL (ref 2–4)
GLUCOSE SERPL-MCNC: 91 MG/DL (ref 70–99)
HCT VFR BLD CALC: 37.7 % (ref 36–46.5)
HGB BLD-MCNC: 11.5 G/DL (ref 12–15.5)
IMM GRANULOCYTES # BLD AUTO: 0 K/MCL (ref 0–0.2)
IMM GRANULOCYTES # BLD: 0 %
LYMPHOCYTES # BLD: 1.3 K/MCL (ref 1–4)
LYMPHOCYTES NFR BLD: 14 %
MCH RBC QN AUTO: 25.5 PG (ref 26–34)
MCHC RBC AUTO-ENTMCNC: 30.5 G/DL (ref 32–36.5)
MCV RBC AUTO: 83.6 FL (ref 78–100)
MONOCYTES # BLD: 0.7 K/MCL (ref 0.3–0.9)
MONOCYTES NFR BLD: 8 %
NEUTROPHILS # BLD: 6.9 K/MCL (ref 1.8–7.7)
NEUTROPHILS NFR BLD: 76 %
NRBC BLD MANUAL-RTO: 0 /100 WBC
PLATELET # BLD AUTO: 314 K/MCL (ref 140–450)
POTASSIUM SERPL-SCNC: 5.1 MMOL/L (ref 3.4–5.1)
PROT SERPL-MCNC: 7.5 G/DL (ref 6.4–8.2)
RBC # BLD: 4.51 MIL/MCL (ref 4–5.2)
SODIUM SERPL-SCNC: 133 MMOL/L (ref 135–145)
WBC # BLD: 9.1 K/MCL (ref 4.2–11)

## 2024-02-10 ENCOUNTER — TELEPHONE (OUTPATIENT)
Dept: INTERNAL MEDICINE | Age: 89
End: 2024-02-10

## 2024-04-18 ENCOUNTER — APPOINTMENT (OUTPATIENT)
Dept: OPTOMETRY | Age: 89
End: 2024-04-18

## 2024-04-18 DIAGNOSIS — Z98.41 S/P CATARACT EXTRACTION AND INSERTION OF INTRAOCULAR LENS, RIGHT: ICD-10-CM

## 2024-04-18 DIAGNOSIS — Z96.1 S/P CATARACT EXTRACTION AND INSERTION OF INTRAOCULAR LENS, LEFT: ICD-10-CM

## 2024-04-18 DIAGNOSIS — H10.45 CHRONIC ALLERGIC CONJUNCTIVITIS: ICD-10-CM

## 2024-04-18 DIAGNOSIS — H43.811 VITREOUS DEGENERATION AND DETACHMENT OF RIGHT EYE: ICD-10-CM

## 2024-04-18 DIAGNOSIS — H04.123 BILATERAL DRY EYES: Primary | ICD-10-CM

## 2024-04-18 DIAGNOSIS — Z98.42 S/P CATARACT EXTRACTION AND INSERTION OF INTRAOCULAR LENS, LEFT: ICD-10-CM

## 2024-04-18 DIAGNOSIS — Z96.1 S/P CATARACT EXTRACTION AND INSERTION OF INTRAOCULAR LENS, RIGHT: ICD-10-CM

## 2024-04-18 DIAGNOSIS — H16.141 PUNCTATE KERATITIS OF RIGHT EYE: ICD-10-CM

## 2024-04-18 ASSESSMENT — VISUAL ACUITY
OD_SC: 20/30
OD_SC+: +2
OS_SC+: +1
OD_SC: JAEGER 3
METHOD: SNELLEN - LINEAR
OS_SC: 20/30

## 2024-04-18 ASSESSMENT — SLIT LAMP EXAM - LIDS
COMMENTS: NORMAL
COMMENTS: NORMAL

## 2024-04-18 ASSESSMENT — CONF VISUAL FIELD
OS_SUPERIOR_TEMPORAL_RESTRICTION: 0
OD_INFERIOR_NASAL_RESTRICTION: 0
OS_NORMAL: 1
OS_INFERIOR_NASAL_RESTRICTION: 0
OD_SUPERIOR_TEMPORAL_RESTRICTION: 0
OD_INFERIOR_TEMPORAL_RESTRICTION: 0
OS_SUPERIOR_NASAL_RESTRICTION: 0
OS_INFERIOR_TEMPORAL_RESTRICTION: 0
OD_SUPERIOR_NASAL_RESTRICTION: 0
OD_NORMAL: 1

## 2024-04-18 ASSESSMENT — EXTERNAL EXAM - RIGHT EYE: OD_EXAM: NORMAL

## 2024-04-18 ASSESSMENT — CUP TO DISC RATIO
OD_RATIO: 0.4
OS_RATIO: 0.4

## 2024-04-18 ASSESSMENT — EXTERNAL EXAM - LEFT EYE: OS_EXAM: NORMAL

## 2024-04-18 ASSESSMENT — TONOMETRY
OD_IOP_MMHG: 12
OS_IOP_MMHG: 13

## 2024-06-28 ENCOUNTER — APPOINTMENT (OUTPATIENT)
Dept: PODIATRY | Age: 89
End: 2024-06-28

## 2024-08-07 ENCOUNTER — APPOINTMENT (OUTPATIENT)
Dept: INTERNAL MEDICINE | Age: 89
End: 2024-08-07

## 2024-11-25 ENCOUNTER — E-ADVICE (OUTPATIENT)
Dept: INTERNAL MEDICINE | Age: 89
End: 2024-11-25

## 2025-04-22 ENCOUNTER — APPOINTMENT (OUTPATIENT)
Dept: OPTOMETRY | Age: OVER 89
End: 2025-04-22